# Patient Record
Sex: MALE | Race: WHITE | NOT HISPANIC OR LATINO | Employment: OTHER | URBAN - METROPOLITAN AREA
[De-identification: names, ages, dates, MRNs, and addresses within clinical notes are randomized per-mention and may not be internally consistent; named-entity substitution may affect disease eponyms.]

---

## 2017-05-18 ENCOUNTER — HISTORICAL (OUTPATIENT)
Dept: RADIOLOGY | Facility: HOSPITAL | Age: 67
End: 2017-05-18

## 2018-02-20 ENCOUNTER — HISTORICAL (OUTPATIENT)
Dept: LAB | Facility: HOSPITAL | Age: 68
End: 2018-02-20

## 2018-02-20 LAB
ALBUMIN SERPL-MCNC: 4 GM/DL (ref 3.4–5)
ALP SERPL-CCNC: 73 UNIT/L (ref 46–116)
ALT SERPL-CCNC: 22 UNIT/L (ref 12–78)
AST SERPL-CCNC: 11 UNIT/L (ref 15–37)
BILIRUB SERPL-MCNC: 0.6 MG/DL (ref 0.2–1)
BILIRUBIN DIRECT+TOT PNL SERPL-MCNC: 0.15 MG/DL (ref 0–0.2)
BILIRUBIN DIRECT+TOT PNL SERPL-MCNC: 0.46 MG/DL (ref 0–0.8)
BUN SERPL-MCNC: 16.4 MG/DL (ref 7–18)
CALCIUM SERPL-MCNC: 9.2 MG/DL (ref 8.5–10.1)
CHLORIDE SERPL-SCNC: 104 MMOL/L (ref 98–107)
CHOLEST SERPL-MCNC: 131 MG/DL (ref 0–200)
CHOLEST/HDLC SERPL: 3.3 {RATIO} (ref 0–5)
CO2 SERPL-SCNC: 29.7 MMOL/L (ref 21–32)
CREAT SERPL-MCNC: 1.24 MG/DL (ref 0.6–1.3)
ERYTHROCYTE [DISTWIDTH] IN BLOOD BY AUTOMATED COUNT: 13.4 % (ref 11.5–17)
FT4I SERPL CALC-MCNC: 1.95
GLUCOSE SERPL-MCNC: 99 MG/DL (ref 74–106)
HCT VFR BLD AUTO: 42.8 % (ref 42–52)
HDLC SERPL-MCNC: 40 MG/DL (ref 40–60)
HGB BLD-MCNC: 14.6 GM/DL (ref 14–18)
LDLC SERPL CALC-MCNC: 69 MG/DL (ref 0–129)
MCH RBC QN AUTO: 30.4 PG (ref 27–31)
MCHC RBC AUTO-ENTMCNC: 34.2 GM/DL (ref 33–36)
MCV RBC AUTO: 88.9 FL (ref 80–94)
PLATELET # BLD AUTO: 181 X10(3)/MCL (ref 130–400)
PMV BLD AUTO: 8.2 FL (ref 7.4–10.4)
POTASSIUM SERPL-SCNC: 5 MMOL/L (ref 3.5–5.1)
PROT SERPL-MCNC: 7.2 GM/DL (ref 6.4–8.2)
RBC # BLD AUTO: 4.81 X10(6)/MCL (ref 4.7–6.1)
SODIUM SERPL-SCNC: 140 MMOL/L (ref 136–145)
T3RU NFR SERPL: 33 % (ref 31–39)
T4 SERPL-MCNC: 5.9 MCG/DL (ref 4.7–13.3)
TRIGL SERPL-MCNC: 110 MG/DL
TSH SERPL-ACNC: 5.69 MIU/ML (ref 0.36–3.74)
VLDLC SERPL CALC-MCNC: 22 MG/DL
WBC # SPEC AUTO: 5.7 X10(3)/MCL (ref 4.5–11.5)

## 2019-03-18 ENCOUNTER — HISTORICAL (OUTPATIENT)
Dept: LAB | Facility: HOSPITAL | Age: 69
End: 2019-03-18

## 2019-03-18 LAB
ABS NEUT (OLG): 2.12 X10(3)/MCL (ref 2.1–9.2)
ALBUMIN SERPL-MCNC: 4.3 GM/DL (ref 3.4–5)
ALP SERPL-CCNC: 74 UNIT/L (ref 46–116)
ALT SERPL-CCNC: 25 UNIT/L (ref 12–78)
AST SERPL-CCNC: 17 UNIT/L (ref 15–37)
BASOPHILS # BLD AUTO: 0 X10(3)/MCL (ref 0–0.2)
BASOPHILS NFR BLD AUTO: 1 %
BILIRUB SERPL-MCNC: 0.4 MG/DL (ref 0.2–1)
BILIRUBIN DIRECT+TOT PNL SERPL-MCNC: 0.12 MG/DL (ref 0–0.2)
BILIRUBIN DIRECT+TOT PNL SERPL-MCNC: 0.28 MG/DL (ref 0–0.8)
BUN SERPL-MCNC: 17.3 MG/DL (ref 7–18)
CALCIUM SERPL-MCNC: 9.6 MG/DL (ref 8.5–10.1)
CHLORIDE SERPL-SCNC: 106 MMOL/L (ref 98–107)
CHOLEST SERPL-MCNC: 137 MG/DL (ref 0–200)
CHOLEST/HDLC SERPL: 3.4 {RATIO} (ref 0–5)
CO2 SERPL-SCNC: 25.8 MMOL/L (ref 21–32)
CREAT SERPL-MCNC: 1.24 MG/DL (ref 0.6–1.3)
CREAT/UREA NIT SERPL: 14
EOSINOPHIL # BLD AUTO: 0.1 X10(3)/MCL (ref 0–0.9)
EOSINOPHIL NFR BLD AUTO: 2 %
ERYTHROCYTE [DISTWIDTH] IN BLOOD BY AUTOMATED COUNT: 11.9 % (ref 11.5–17)
FT4I SERPL CALC-MCNC: 1.79
GLUCOSE SERPL-MCNC: 104 MG/DL (ref 74–106)
HCT VFR BLD AUTO: 38.1 % (ref 42–52)
HDLC SERPL-MCNC: 40 MG/DL (ref 40–60)
HGB BLD-MCNC: 13.3 GM/DL (ref 14–18)
IMM GRANULOCYTES # BLD AUTO: 0.01 % (ref 0–0.02)
IMM GRANULOCYTES NFR BLD AUTO: 0.3 % (ref 0–0.43)
LDLC SERPL CALC-MCNC: 67 MG/DL (ref 0–129)
LYMPHOCYTES # BLD AUTO: 0.7 X10(3)/MCL (ref 0.6–4.6)
LYMPHOCYTES NFR BLD AUTO: 22 %
MCH RBC QN AUTO: 31.7 PG (ref 27–31)
MCHC RBC AUTO-ENTMCNC: 34.9 GM/DL (ref 33–36)
MCV RBC AUTO: 90.9 FL (ref 80–94)
MONOCYTES # BLD AUTO: 0.4 X10(3)/MCL (ref 0.1–1.3)
MONOCYTES NFR BLD AUTO: 11 %
NEUTROPHILS # BLD AUTO: 2.12 X10(3)/MCL (ref 1.4–7.9)
NEUTROPHILS NFR BLD AUTO: 65 %
PLATELET # BLD AUTO: 185 X10(3)/MCL (ref 130–400)
PMV BLD AUTO: 9.4 FL (ref 9.4–12.4)
POTASSIUM SERPL-SCNC: 4.7 MMOL/L (ref 3.5–5.1)
PROT SERPL-MCNC: 7.1 GM/DL (ref 6.4–8.2)
RBC # BLD AUTO: 4.19 X10(6)/MCL (ref 4.7–6.1)
SODIUM SERPL-SCNC: 143 MMOL/L (ref 136–145)
T3RU NFR SERPL: 32 % (ref 31–39)
T4 SERPL-MCNC: 5.6 MCG/DL (ref 4.7–13.3)
TRIGL SERPL-MCNC: 152 MG/DL
TSH SERPL-ACNC: 1.28 MIU/ML (ref 0.36–3.74)
VLDLC SERPL CALC-MCNC: 30 MG/DL
WBC # SPEC AUTO: 3.3 X10(3)/MCL (ref 4.5–11.5)

## 2019-11-14 ENCOUNTER — HISTORICAL (OUTPATIENT)
Dept: RADIOLOGY | Facility: HOSPITAL | Age: 69
End: 2019-11-14

## 2019-11-14 LAB
BNP BLD-MCNC: 83 PG/ML (ref 0–125)
ERYTHROCYTE [DISTWIDTH] IN BLOOD BY AUTOMATED COUNT: 12.8 % (ref 11.5–17)
HCT VFR BLD AUTO: 40.9 % (ref 42–52)
HGB BLD-MCNC: 13.2 GM/DL (ref 14–18)
MCH RBC QN AUTO: 31.2 PG (ref 27–31)
MCHC RBC AUTO-ENTMCNC: 32.3 GM/DL (ref 33–36)
MCV RBC AUTO: 96.7 FL (ref 80–94)
PLATELET # BLD AUTO: 228 X10(3)/MCL (ref 130–400)
PMV BLD AUTO: 10.2 FL (ref 9.4–12.4)
PSA SERPL-MCNC: <0.01 NG/ML (ref 0–4)
RBC # BLD AUTO: 4.23 X10(6)/MCL (ref 4.7–6.1)
TSH SERPL-ACNC: 2.64 MIU/ML (ref 0.36–3.74)
WBC # SPEC AUTO: 4.5 X10(3)/MCL (ref 4.5–11.5)

## 2019-12-03 ENCOUNTER — HISTORICAL (OUTPATIENT)
Dept: RESPIRATORY THERAPY | Facility: HOSPITAL | Age: 69
End: 2019-12-03

## 2020-01-24 ENCOUNTER — HISTORICAL (OUTPATIENT)
Dept: CARDIOLOGY | Facility: HOSPITAL | Age: 70
End: 2020-01-24

## 2020-05-08 ENCOUNTER — HISTORICAL (OUTPATIENT)
Dept: LAB | Facility: HOSPITAL | Age: 70
End: 2020-05-08

## 2020-05-08 LAB
ABS NEUT (OLG): 2.64 X10(3)/MCL (ref 2.1–9.2)
ALBUMIN SERPL-MCNC: 4.6 GM/DL (ref 3.4–5)
ALP SERPL-CCNC: 75 UNIT/L (ref 46–116)
ALT SERPL-CCNC: 21 UNIT/L (ref 12–78)
ANTINUCLEAR ANTIBODY SCREEN (OHS): NEGATIVE
AST SERPL-CCNC: 11 UNIT/L (ref 15–37)
BASOPHILS # BLD AUTO: 0 X10(3)/MCL (ref 0–0.2)
BASOPHILS NFR BLD AUTO: 1 %
BILIRUB SERPL-MCNC: 0.6 MG/DL (ref 0.2–1)
BILIRUBIN DIRECT+TOT PNL SERPL-MCNC: 0.18 MG/DL (ref 0–0.2)
BILIRUBIN DIRECT+TOT PNL SERPL-MCNC: 0.42 MG/DL (ref 0–0.8)
BUN SERPL-MCNC: 14.4 MG/DL (ref 7–18)
CALCIUM SERPL-MCNC: 8.9 MG/DL (ref 8.5–10.1)
CHLORIDE SERPL-SCNC: 105 MMOL/L (ref 98–107)
CHOLEST SERPL-MCNC: 123 MG/DL (ref 0–200)
CHOLEST/HDLC SERPL: 3.3 {RATIO} (ref 0–5)
CO2 SERPL-SCNC: 24.8 MMOL/L (ref 21–32)
CREAT SERPL-MCNC: 1.27 MG/DL (ref 0.6–1.3)
CREAT/UREA NIT SERPL: 11
DSDNA ANTIBODY (OHS): NEGATIVE
EOSINOPHIL # BLD AUTO: 0.1 X10(3)/MCL (ref 0–0.9)
EOSINOPHIL NFR BLD AUTO: 4 %
ERYTHROCYTE [DISTWIDTH] IN BLOOD BY AUTOMATED COUNT: 12.5 % (ref 11.5–17)
ERYTHROCYTE [SEDIMENTATION RATE] IN BLOOD: 7 MM/HR (ref 0–15)
EST. AVERAGE GLUCOSE BLD GHB EST-MCNC: 111.2 MG/DL
FT4I SERPL CALC-MCNC: 2.45
GLUCOSE SERPL-MCNC: 114 MG/DL (ref 74–106)
HBA1C MFR BLD: 5.5 %
HCT VFR BLD AUTO: 37.5 % (ref 42–52)
HDLC SERPL-MCNC: 37 MG/DL (ref 40–60)
HGB BLD-MCNC: 13 GM/DL (ref 14–18)
IMM GRANULOCYTES # BLD AUTO: 0.01 % (ref 0–0.02)
IMM GRANULOCYTES NFR BLD AUTO: 0.3 % (ref 0–0.43)
LDLC SERPL CALC-MCNC: 65 MG/DL (ref 0–129)
LYMPHOCYTES # BLD AUTO: 0.8 X10(3)/MCL (ref 0.6–4.6)
LYMPHOCYTES NFR BLD AUTO: 21 %
MCH RBC QN AUTO: 31.9 PG (ref 27–31)
MCHC RBC AUTO-ENTMCNC: 34.7 GM/DL (ref 33–36)
MCV RBC AUTO: 91.9 FL (ref 80–94)
MONOCYTES # BLD AUTO: 0.3 X10(3)/MCL (ref 0.1–1.3)
MONOCYTES NFR BLD AUTO: 8 %
NEUTROPHILS # BLD AUTO: 2.64 X10(3)/MCL (ref 1.4–7.9)
NEUTROPHILS NFR BLD AUTO: 66 %
PLATELET # BLD AUTO: 207 X10(3)/MCL (ref 130–400)
PMV BLD AUTO: 9.3 FL (ref 9.4–12.4)
POTASSIUM SERPL-SCNC: 4 MMOL/L (ref 3.5–5.1)
PROT SERPL-MCNC: 7.1 GM/DL (ref 6.4–8.2)
PSA SERPL-MCNC: 0.02 NG/ML (ref 0–4)
RBC # BLD AUTO: 4.08 X10(6)/MCL (ref 4.7–6.1)
RHEUMATOID FACT SERPL-ACNC: <13 IU/ML
SMITH DP IGG (OHS): NEGATIVE
SODIUM SERPL-SCNC: 141 MMOL/L (ref 136–145)
T3RU NFR SERPL: 34 % (ref 31–39)
T4 SERPL-MCNC: 7.2 MCG/DL (ref 4.7–13.3)
TRIGL SERPL-MCNC: 104 MG/DL
TSH SERPL-ACNC: 3.12 MIU/ML (ref 0.36–3.74)
U1RNP ANTIBODY (OHS): NEGATIVE
VLDLC SERPL CALC-MCNC: 21 MG/DL
WBC # SPEC AUTO: 4 X10(3)/MCL (ref 4.5–11.5)

## 2020-11-04 ENCOUNTER — HISTORICAL (OUTPATIENT)
Dept: ADMINISTRATIVE | Facility: HOSPITAL | Age: 70
End: 2020-11-04

## 2020-12-07 ENCOUNTER — HISTORICAL (OUTPATIENT)
Dept: ADMINISTRATIVE | Facility: HOSPITAL | Age: 70
End: 2020-12-07

## 2021-06-08 ENCOUNTER — HISTORICAL (OUTPATIENT)
Dept: LAB | Facility: HOSPITAL | Age: 71
End: 2021-06-08

## 2021-06-08 LAB
ABS NEUT (OLG): 3.34 X10(3)/MCL (ref 2.1–9.2)
ALBUMIN SERPL-MCNC: 4 GM/DL (ref 3.4–4.8)
ALP SERPL-CCNC: 81 UNIT/L (ref 40–150)
ALT SERPL-CCNC: 13 UNIT/L (ref 0–55)
AST SERPL-CCNC: 14 UNIT/L (ref 5–34)
BASOPHILS # BLD AUTO: 0 X10(3)/MCL (ref 0–0.2)
BASOPHILS NFR BLD AUTO: 1 %
BILIRUB SERPL-MCNC: 0.6 MG/DL
BILIRUBIN DIRECT+TOT PNL SERPL-MCNC: 0.3 MG/DL (ref 0–0.5)
BILIRUBIN DIRECT+TOT PNL SERPL-MCNC: 0.3 MG/DL (ref 0–0.8)
BUN SERPL-MCNC: 14.9 MG/DL (ref 8.4–25.7)
CALCIUM SERPL-MCNC: 8.9 MG/DL (ref 8.8–10)
CHLORIDE SERPL-SCNC: 106 MMOL/L (ref 98–107)
CHOLEST SERPL-MCNC: 137 MG/DL
CHOLEST/HDLC SERPL: 4 {RATIO} (ref 0–5)
CO2 SERPL-SCNC: 24 MMOL/L (ref 23–31)
CREAT SERPL-MCNC: 0.99 MG/DL (ref 0.73–1.18)
CREAT/UREA NIT SERPL: 15
EOSINOPHIL # BLD AUTO: 0.2 X10(3)/MCL (ref 0–0.9)
EOSINOPHIL NFR BLD AUTO: 4 %
ERYTHROCYTE [DISTWIDTH] IN BLOOD BY AUTOMATED COUNT: 12.7 % (ref 11.5–17)
FT4I SERPL CALC-MCNC: 2.25 (ref 2.6–3.6)
GLUCOSE SERPL-MCNC: 102 MG/DL (ref 82–115)
HCT VFR BLD AUTO: 39 % (ref 42–52)
HDLC SERPL-MCNC: 32 MG/DL (ref 35–60)
HGB BLD-MCNC: 13.2 GM/DL (ref 14–18)
IMM GRANULOCYTES # BLD AUTO: 0.01 % (ref 0–0.02)
IMM GRANULOCYTES NFR BLD AUTO: 0.2 % (ref 0–0.43)
LDLC SERPL CALC-MCNC: 48 MG/DL (ref 50–140)
LYMPHOCYTES # BLD AUTO: 1.4 X10(3)/MCL (ref 0.6–4.6)
LYMPHOCYTES NFR BLD AUTO: 25 %
MCH RBC QN AUTO: 30.8 PG (ref 27–31)
MCHC RBC AUTO-ENTMCNC: 33.8 GM/DL (ref 33–36)
MCV RBC AUTO: 90.9 FL (ref 80–94)
MONOCYTES # BLD AUTO: 0.4 X10(3)/MCL (ref 0.1–1.3)
MONOCYTES NFR BLD AUTO: 8 %
NEUTROPHILS # BLD AUTO: 3.34 X10(3)/MCL (ref 1.4–7.9)
NEUTROPHILS NFR BLD AUTO: 62 %
PLATELET # BLD AUTO: 228 X10(3)/MCL (ref 130–400)
PMV BLD AUTO: 10.4 FL (ref 9.4–12.4)
POTASSIUM SERPL-SCNC: 3.8 MMOL/L (ref 3.5–5.1)
PROT SERPL-MCNC: 6.7 GM/DL (ref 5.8–7.6)
PSA SERPL-MCNC: <0.01 NG/ML (ref 0–4)
RBC # BLD AUTO: 4.29 X10(6)/MCL (ref 4.7–6.1)
SODIUM SERPL-SCNC: 142 MMOL/L (ref 136–145)
T3RU NFR SERPL: 36.4 % (ref 31–39)
T4 SERPL-MCNC: 6.18 UG/DL (ref 4.87–11.72)
TRIGL SERPL-MCNC: 283 MG/DL (ref 34–140)
TSH SERPL-ACNC: 2.31 UIU/ML (ref 0.35–4.94)
VLDLC SERPL CALC-MCNC: 57 MG/DL
WBC # SPEC AUTO: 5.4 X10(3)/MCL (ref 4.5–11.5)

## 2021-08-05 ENCOUNTER — HISTORICAL (OUTPATIENT)
Dept: RADIOLOGY | Facility: HOSPITAL | Age: 71
End: 2021-08-05

## 2021-08-05 LAB
ABS NEUT (OLG): 2.99 X10(3)/MCL (ref 2.1–9.2)
ALBUMIN SERPL-MCNC: 3.9 GM/DL (ref 3.4–4.8)
ALBUMIN/GLOB SERPL: 1.3 RATIO (ref 1.1–2)
ALP SERPL-CCNC: 86 UNIT/L (ref 40–150)
ALT SERPL-CCNC: 15 UNIT/L (ref 0–55)
AST SERPL-CCNC: 15 UNIT/L (ref 5–34)
BASOPHILS # BLD AUTO: 0 X10(3)/MCL (ref 0–0.2)
BASOPHILS NFR BLD AUTO: 1 %
BILIRUB SERPL-MCNC: 0.6 MG/DL
BILIRUBIN DIRECT+TOT PNL SERPL-MCNC: 0.3 MG/DL (ref 0–0.5)
BILIRUBIN DIRECT+TOT PNL SERPL-MCNC: 0.3 MG/DL (ref 0–0.8)
BUN SERPL-MCNC: 17 MG/DL (ref 8.4–25.7)
CALCIUM SERPL-MCNC: 9.2 MG/DL (ref 8.8–10)
CHLORIDE SERPL-SCNC: 109 MMOL/L (ref 98–107)
CO2 SERPL-SCNC: 22 MMOL/L (ref 23–31)
CREAT SERPL-MCNC: 0.96 MG/DL (ref 0.73–1.18)
EOSINOPHIL # BLD AUTO: 0.2 X10(3)/MCL (ref 0–0.9)
EOSINOPHIL NFR BLD AUTO: 4 %
ERYTHROCYTE [DISTWIDTH] IN BLOOD BY AUTOMATED COUNT: 13.6 % (ref 11.5–17)
GLOBULIN SER-MCNC: 2.9 GM/DL (ref 2.4–3.5)
GLUCOSE SERPL-MCNC: 105 MG/DL (ref 82–115)
HCT VFR BLD AUTO: 38.9 % (ref 42–52)
HGB BLD-MCNC: 13.1 GM/DL (ref 14–18)
IMM GRANULOCYTES # BLD AUTO: 0.01 % (ref 0–0.02)
IMM GRANULOCYTES NFR BLD AUTO: 0.2 % (ref 0–0.43)
LYMPHOCYTES # BLD AUTO: 1.2 X10(3)/MCL (ref 0.6–4.6)
LYMPHOCYTES NFR BLD AUTO: 25 %
MCH RBC QN AUTO: 31.5 PG (ref 27–31)
MCHC RBC AUTO-ENTMCNC: 33.7 GM/DL (ref 33–36)
MCV RBC AUTO: 93.5 FL (ref 80–94)
MONOCYTES # BLD AUTO: 0.5 X10(3)/MCL (ref 0.1–1.3)
MONOCYTES NFR BLD AUTO: 10 %
NEUTROPHILS # BLD AUTO: 2.99 X10(3)/MCL (ref 1.4–7.9)
NEUTROPHILS NFR BLD AUTO: 61 %
PLATELET # BLD AUTO: 210 X10(3)/MCL (ref 130–400)
PMV BLD AUTO: 9.4 FL (ref 9.4–12.4)
POTASSIUM SERPL-SCNC: 4.6 MMOL/L (ref 3.5–5.1)
PROT SERPL-MCNC: 6.8 GM/DL (ref 5.8–7.6)
RBC # BLD AUTO: 4.16 X10(6)/MCL (ref 4.7–6.1)
SODIUM SERPL-SCNC: 138 MMOL/L (ref 136–145)
WBC # SPEC AUTO: 4.9 X10(3)/MCL (ref 4.5–11.5)

## 2022-04-30 NOTE — OP NOTE
Patient:   Avery Hill Jr            MRN: [id number]            FIN: [id number]               Age:   70 years     Sex:  Male     :  1950   Associated Diagnoses:   None   Author:   Josh Fiore MD      Preoperative diagnosis: Cataract of the right  eye     Postoperative diagnosis: Same     Operative procedure: Cataract extraction with intraocular lens implant    Lens Style: ZCB00    The patient was brought to the operating theater by wheelchair and under light sedation given topical anesthesia using 0.75% Marcaine.  After adequate anesthesia the patient was then prepped and draped in usual ophthalmological manner.   Nanci lid speculums were applied and under the surgical microscope a keratome incision was made temporally using a danielle keratome blade and a 15° danielel keratome stab incision was made in the superior nasal quadrant.  Viscoat was instilled into the anterior chamber and an anterior capsulorrhexis was performed using a bent 25-gauge needle and the anterior capsule flap withdrawn with Kelman forceps. Using an irrigating Kelman cystotome the nucleus was irrigated and rocked free from the cortical bed and the handpiece was withdrawn. The phacoemulsification handpiece was introduced into the eye and phacoemulsification carried out the posterior chamber and the iris plane while a nucleus manipulator was used to direct the nucleus fragments  towards the phacoemulsification tip and prevent corneal contact. After phacoemulsification of the nucleus was completed the handpiece was withdrawn and an irrigation-aspiration handpiece was introduced into the eye and all visible cortical fragments were aspirated from the eye without difficulty. The handpiece was withdrawn and Healon was introduced into the eye to inflate the anterior chamber and the capsular bag.  An intraocular lens implant was selected, inspected, folded and placed into the lens injector and then the lens carefully injected  into the capsular bag while the haptics were positioned using the nucleus manipulator. The Healon was then aspirated from the eye using an irrigation-aspiration handpiece and the handpiece withdrawn from the eye. The anterior chamber was inflated with balanced salt solution and the wound checked for water tightness and found to be intact.  The antibiotic drops used preoperatively were instilled into the eye and the patient sent to the outpatient recovery in good condition.

## 2022-04-30 NOTE — OP NOTE
Patient:   Avery Hill Jr            MRN: [id number]            FIN: [id number]               Age:   70 years     Sex:  Male     :  1950   Associated Diagnoses:   None   Author:   Josh Fiore MD      Preoperative diagnosis: Cataract of the left eye     Postoperative diagnosis: Same     Operative procedure: Cataract extraction with intraocular lens implant    Lens Style: ZCB00    The patient was brought to the operating theater by wheelchair and under light sedation given topical anesthesia using 0.75% Marcaine.  After adequate anesthesia the patient was then prepped and draped in usual ophthalmological manner.   Nanci lid speculums were applied and under the surgical microscope a keratome incision was made temporally using a danielle keratome blade and a 15° danielle keratome stab incision was made in the superior nasal quadrant.  Viscoat was instilled into the anterior chamber and an anterior capsulorrhexis was performed using a bent 25-gauge needle and the anterior capsule flap withdrawn with Kelman forceps. Using an irrigating Kelman cystotome the nucleus was irrigated and rocked free from the cortical bed and the handpiece was withdrawn. The phacoemulsification handpiece was introduced into the eye and phacoemulsification carried out the posterior chamber and the iris plane while a nucleus manipulator was used to direct the nucleus fragments  towards the phacoemulsification tip and prevent corneal contact. After phacoemulsification of the nucleus was completed the handpiece was withdrawn and an irrigation-aspiration handpiece was introduced into the eye and all visible cortical fragments were aspirated from the eye without difficulty. The handpiece was withdrawn and Healon was introduced into the eye to inflate the anterior chamber and the capsular bag.  An intraocular lens implant was selected, inspected, folded and placed into the lens injector and then the lens carefully injected  into the capsular bag while the haptics were positioned using the nucleus manipulator. The Healon was then aspirated from the eye using an irrigation-aspiration handpiece and the handpiece withdrawn from the eye. The anterior chamber was inflated with balanced salt solution and the wound checked for water tightness and found to be intact.  The antibiotic drops used preoperatively were instilled into the eye and the patient sent to the outpatient recovery in good condition.

## 2022-06-17 ENCOUNTER — LAB VISIT (OUTPATIENT)
Dept: LAB | Facility: HOSPITAL | Age: 72
End: 2022-06-17
Attending: FAMILY MEDICINE
Payer: MEDICARE

## 2022-06-17 DIAGNOSIS — R73.09 OTHER ABNORMAL GLUCOSE: ICD-10-CM

## 2022-06-17 DIAGNOSIS — Z12.5 ENCOUNTER FOR SCREENING FOR MALIGNANT NEOPLASM OF PROSTATE: ICD-10-CM

## 2022-06-17 DIAGNOSIS — I10 ESSENTIAL HYPERTENSION, MALIGNANT: ICD-10-CM

## 2022-06-17 DIAGNOSIS — Z00.00 ROUTINE GENERAL MEDICAL EXAMINATION AT A HEALTH CARE FACILITY: Primary | ICD-10-CM

## 2022-06-17 LAB
ALBUMIN SERPL-MCNC: 4.2 GM/DL (ref 3.4–4.8)
ALP SERPL-CCNC: 74 UNIT/L (ref 40–150)
ALT SERPL-CCNC: 29 UNIT/L (ref 0–55)
ANION GAP SERPL CALC-SCNC: 10 MEQ/L
AST SERPL-CCNC: 25 UNIT/L (ref 5–34)
BASOPHILS # BLD AUTO: 0.03 X10(3)/MCL (ref 0–0.2)
BASOPHILS NFR BLD AUTO: 0.7 %
BILIRUBIN DIRECT+TOT PNL SERPL-MCNC: 0.2 MG/DL (ref 0–0.5)
BILIRUBIN DIRECT+TOT PNL SERPL-MCNC: 0.4 MG/DL (ref 0–0.8)
BILIRUBIN DIRECT+TOT PNL SERPL-MCNC: 0.6 MG/DL
BUN SERPL-MCNC: 15.5 MG/DL (ref 8.4–25.7)
CALCIUM SERPL-MCNC: 9.2 MG/DL (ref 8.8–10)
CHLORIDE SERPL-SCNC: 108 MMOL/L (ref 98–107)
CHOLEST SERPL-MCNC: 148 MG/DL
CHOLEST/HDLC SERPL: 3 {RATIO} (ref 0–5)
CO2 SERPL-SCNC: 24 MMOL/L (ref 23–31)
CREAT SERPL-MCNC: 1.12 MG/DL (ref 0.73–1.18)
CREAT/UREA NIT SERPL: 14
EOSINOPHIL # BLD AUTO: 0.17 X10(3)/MCL (ref 0–0.9)
EOSINOPHIL NFR BLD AUTO: 3.8 %
ERYTHROCYTE [DISTWIDTH] IN BLOOD BY AUTOMATED COUNT: 13.1 % (ref 11.5–17)
FT4I SERPL CALC-MCNC: 2.3 (ref 2.6–3.6)
GLUCOSE SERPL-MCNC: 117 MG/DL (ref 82–115)
HCT VFR BLD AUTO: 40.3 % (ref 42–52)
HDLC SERPL-MCNC: 43 MG/DL (ref 35–60)
HGB BLD-MCNC: 13.2 GM/DL (ref 14–18)
IMM GRANULOCYTES # BLD AUTO: 0.01 X10(3)/MCL (ref 0–0.02)
IMM GRANULOCYTES NFR BLD AUTO: 0.2 % (ref 0–0.43)
LDLC SERPL CALC-MCNC: 69 MG/DL (ref 50–140)
LYMPHOCYTES # BLD AUTO: 0.98 X10(3)/MCL (ref 0.6–4.6)
LYMPHOCYTES NFR BLD AUTO: 22 %
MCH RBC QN AUTO: 30.8 PG (ref 27–31)
MCHC RBC AUTO-ENTMCNC: 32.8 MG/DL (ref 33–36)
MCV RBC AUTO: 94.2 FL (ref 80–94)
MONOCYTES # BLD AUTO: 0.43 X10(3)/MCL (ref 0.1–1.3)
MONOCYTES NFR BLD AUTO: 9.6 %
NEUTROPHILS # BLD AUTO: 2.8 X10(3)/MCL (ref 2.1–9.2)
NEUTROPHILS NFR BLD AUTO: 63.7 %
PLATELET # BLD AUTO: 218 X10(3)/MCL (ref 130–400)
PMV BLD AUTO: 10.2 FL (ref 9.4–12.4)
POTASSIUM SERPL-SCNC: 4.3 MMOL/L (ref 3.5–5.1)
PROT SERPL-MCNC: 7.1 GM/DL (ref 5.8–7.6)
PSA SERPL-MCNC: <0.02 NG/ML
RBC # BLD AUTO: 4.28 X10(6)/MCL (ref 4.7–6.1)
SODIUM SERPL-SCNC: 142 MMOL/L (ref 136–145)
T3RU NFR SERPL: 36.69 % (ref 31–39)
T4 SERPL-MCNC: 6.26 UG/DL (ref 4.87–11.72)
TRIGL SERPL-MCNC: 180 MG/DL (ref 34–140)
TSH SERPL-ACNC: 1.2 UIU/ML (ref 0.35–4.94)
VLDLC SERPL CALC-MCNC: 36 MG/DL
WBC # SPEC AUTO: 4.5 X10(3)/MCL (ref 4.5–11.5)

## 2022-06-17 PROCEDURE — 84436 ASSAY OF TOTAL THYROXINE: CPT

## 2022-06-17 PROCEDURE — 84443 ASSAY THYROID STIM HORMONE: CPT

## 2022-06-17 PROCEDURE — 84153 ASSAY OF PSA TOTAL: CPT

## 2022-06-17 PROCEDURE — 84479 ASSAY OF THYROID (T3 OR T4): CPT

## 2022-06-17 PROCEDURE — 80061 LIPID PANEL: CPT

## 2022-06-17 PROCEDURE — 85025 COMPLETE CBC W/AUTO DIFF WBC: CPT

## 2022-06-17 PROCEDURE — 36415 COLL VENOUS BLD VENIPUNCTURE: CPT

## 2022-06-17 PROCEDURE — 80053 COMPREHEN METABOLIC PANEL: CPT

## 2022-06-17 PROCEDURE — 82248 BILIRUBIN DIRECT: CPT

## 2022-06-17 PROCEDURE — 83036 HEMOGLOBIN GLYCOSYLATED A1C: CPT

## 2022-06-20 LAB
EST. AVERAGE GLUCOSE BLD GHB EST-MCNC: 111.2 MG/DL
HBA1C MFR BLD: 5.5 %

## 2022-08-15 ENCOUNTER — HOSPITAL ENCOUNTER (INPATIENT)
Facility: HOSPITAL | Age: 72
LOS: 2 days | Discharge: HOME | DRG: 439 | End: 2022-08-19
Attending: EMERGENCY MEDICINE | Admitting: STUDENT IN AN ORGANIZED HEALTH CARE EDUCATION/TRAINING PROGRAM
Payer: MEDICARE

## 2022-08-15 DIAGNOSIS — K85.10 ACUTE GALLSTONE PANCREATITIS: Primary | ICD-10-CM

## 2022-08-15 DIAGNOSIS — K85.20 ALCOHOL-INDUCED ACUTE PANCREATITIS WITHOUT INFECTION OR NECROSIS: ICD-10-CM

## 2022-08-15 PROBLEM — N40.0 BENIGN PROSTATIC HYPERPLASIA: Status: ACTIVE | Noted: 2022-08-15

## 2022-08-15 PROBLEM — M54.9 CHRONIC BACK PAIN: Status: ACTIVE | Noted: 2022-08-15

## 2022-08-15 PROBLEM — I25.10 ARTERIOSCLEROSIS OF CORONARY ARTERY: Status: ACTIVE | Noted: 2022-08-15

## 2022-08-15 PROBLEM — R01.1 HEART MURMUR: Status: ACTIVE | Noted: 2022-08-15

## 2022-08-15 PROBLEM — E78.5 HYPERLIPIDEMIA: Status: ACTIVE | Noted: 2022-08-15

## 2022-08-15 PROBLEM — I10 HYPERTENSION: Status: ACTIVE | Noted: 2022-08-15

## 2022-08-15 PROBLEM — G89.29 CHRONIC BACK PAIN: Status: ACTIVE | Noted: 2022-08-15

## 2022-08-15 PROBLEM — C61 CARCINOMA OF PROSTATE: Status: ACTIVE | Noted: 2022-08-15

## 2022-08-15 LAB
ALBUMIN SERPL-MCNC: 3.9 GM/DL (ref 3.4–4.8)
ALBUMIN/GLOB SERPL: 1.3 RATIO (ref 1.1–2)
ALP SERPL-CCNC: 120 UNIT/L (ref 40–150)
ALT SERPL-CCNC: 288 UNIT/L (ref 0–55)
AMYLASE SERPL-CCNC: 191 UNIT/L (ref 20–160)
APPEARANCE UR: CLEAR
AST SERPL-CCNC: 362 UNIT/L (ref 5–34)
BACTERIA #/AREA URNS AUTO: NORMAL /HPF
BASOPHILS # BLD AUTO: 0.01 X10(3)/MCL (ref 0–0.2)
BASOPHILS NFR BLD AUTO: 0.1 %
BILIRUB UR QL STRIP.AUTO: ABNORMAL MG/DL
BILIRUBIN DIRECT+TOT PNL SERPL-MCNC: 3.3 MG/DL
BUN SERPL-MCNC: 16 MG/DL (ref 8.4–25.7)
CALCIUM SERPL-MCNC: 9.1 MG/DL (ref 8.8–10)
CHLORIDE SERPL-SCNC: 105 MMOL/L (ref 98–107)
CO2 SERPL-SCNC: 25 MMOL/L (ref 23–31)
COLOR UR AUTO: ABNORMAL
CREAT SERPL-MCNC: 1.04 MG/DL (ref 0.73–1.18)
EOSINOPHIL # BLD AUTO: 0.1 X10(3)/MCL (ref 0–0.9)
EOSINOPHIL NFR BLD AUTO: 1 %
ERYTHROCYTE [DISTWIDTH] IN BLOOD BY AUTOMATED COUNT: 12.9 % (ref 11.5–17)
FLUAV AG UPPER RESP QL IA.RAPID: NOT DETECTED
FLUBV AG UPPER RESP QL IA.RAPID: NOT DETECTED
GFR SERPLBLD CREATININE-BSD FMLA CKD-EPI: >60 MLS/MIN/1.73/M2
GLOBULIN SER-MCNC: 3 GM/DL (ref 2.4–3.5)
GLUCOSE SERPL-MCNC: 148 MG/DL (ref 82–115)
GLUCOSE UR QL STRIP.AUTO: NEGATIVE MG/DL
HCT VFR BLD AUTO: 37.1 % (ref 42–52)
HGB BLD-MCNC: 12.6 GM/DL (ref 14–18)
KETONES UR QL STRIP.AUTO: ABNORMAL MG/DL
LEUKOCYTE ESTERASE UR QL STRIP.AUTO: NEGATIVE UNIT/L
LIPASE SERPL-CCNC: 237 U/L
LYMPHOCYTES # BLD AUTO: 0.45 X10(3)/MCL (ref 0.6–4.6)
LYMPHOCYTES NFR BLD AUTO: 4.6 %
MCH RBC QN AUTO: 31.4 PG (ref 27–31)
MCHC RBC AUTO-ENTMCNC: 34 MG/DL (ref 33–36)
MCV RBC AUTO: 92.5 FL (ref 80–94)
MONOCYTES # BLD AUTO: 0.46 X10(3)/MCL (ref 0.1–1.3)
MONOCYTES NFR BLD AUTO: 4.8 %
NEUTROPHILS # BLD AUTO: 8.6 X10(3)/MCL (ref 2.1–9.2)
NEUTROPHILS NFR BLD AUTO: 89.2 %
NITRITE UR QL STRIP.AUTO: NEGATIVE
PH UR STRIP.AUTO: 6 [PH]
PLATELET # BLD AUTO: 226 X10(3)/MCL (ref 130–400)
PMV BLD AUTO: 9.9 FL (ref 7.4–10.4)
POTASSIUM SERPL-SCNC: 4.3 MMOL/L (ref 3.5–5.1)
PROT SERPL-MCNC: 6.9 GM/DL (ref 5.8–7.6)
PROT UR QL STRIP.AUTO: ABNORMAL MG/DL
RBC # BLD AUTO: 4.01 X10(6)/MCL (ref 4.7–6.1)
RBC #/AREA URNS AUTO: NORMAL /HPF
RBC UR QL AUTO: NEGATIVE UNIT/L
SARS-COV-2 RNA RESP QL NAA+PROBE: NOT DETECTED
SODIUM SERPL-SCNC: 139 MMOL/L (ref 136–145)
SP GR UR STRIP.AUTO: 1.02
SQUAMOUS #/AREA URNS AUTO: NORMAL /HPF
UROBILINOGEN UR STRIP-ACNC: >=8 MG/DL
WBC # SPEC AUTO: 9.7 X10(3)/MCL (ref 4.5–11.5)
WBC #/AREA URNS AUTO: NORMAL /HPF

## 2022-08-15 PROCEDURE — 36415 COLL VENOUS BLD VENIPUNCTURE: CPT | Performed by: EMERGENCY MEDICINE

## 2022-08-15 PROCEDURE — G0378 HOSPITAL OBSERVATION PER HR: HCPCS

## 2022-08-15 PROCEDURE — 25000003 PHARM REV CODE 250: Performed by: EMERGENCY MEDICINE

## 2022-08-15 PROCEDURE — 25000003 PHARM REV CODE 250: Performed by: STUDENT IN AN ORGANIZED HEALTH CARE EDUCATION/TRAINING PROGRAM

## 2022-08-15 PROCEDURE — 87636 SARSCOV2 & INF A&B AMP PRB: CPT | Performed by: EMERGENCY MEDICINE

## 2022-08-15 PROCEDURE — 81001 URINALYSIS AUTO W/SCOPE: CPT | Performed by: EMERGENCY MEDICINE

## 2022-08-15 PROCEDURE — 83690 ASSAY OF LIPASE: CPT | Performed by: EMERGENCY MEDICINE

## 2022-08-15 PROCEDURE — 82150 ASSAY OF AMYLASE: CPT | Performed by: STUDENT IN AN ORGANIZED HEALTH CARE EDUCATION/TRAINING PROGRAM

## 2022-08-15 PROCEDURE — 85025 COMPLETE CBC W/AUTO DIFF WBC: CPT | Performed by: EMERGENCY MEDICINE

## 2022-08-15 PROCEDURE — 63600175 PHARM REV CODE 636 W HCPCS: Performed by: STUDENT IN AN ORGANIZED HEALTH CARE EDUCATION/TRAINING PROGRAM

## 2022-08-15 PROCEDURE — 80053 COMPREHEN METABOLIC PANEL: CPT | Performed by: EMERGENCY MEDICINE

## 2022-08-15 PROCEDURE — 25500020 PHARM REV CODE 255: Performed by: EMERGENCY MEDICINE

## 2022-08-15 PROCEDURE — 87040 BLOOD CULTURE FOR BACTERIA: CPT | Performed by: STUDENT IN AN ORGANIZED HEALTH CARE EDUCATION/TRAINING PROGRAM

## 2022-08-15 PROCEDURE — 36415 COLL VENOUS BLD VENIPUNCTURE: CPT | Performed by: STUDENT IN AN ORGANIZED HEALTH CARE EDUCATION/TRAINING PROGRAM

## 2022-08-15 PROCEDURE — 87077 CULTURE AEROBIC IDENTIFY: CPT | Performed by: STUDENT IN AN ORGANIZED HEALTH CARE EDUCATION/TRAINING PROGRAM

## 2022-08-15 PROCEDURE — 99285 EMERGENCY DEPT VISIT HI MDM: CPT | Mod: 25,CS

## 2022-08-15 RX ORDER — LORAZEPAM 2 MG/ML
0.5 INJECTION INTRAMUSCULAR EVERY 4 HOURS PRN
Status: DISCONTINUED | OUTPATIENT
Start: 2022-08-15 | End: 2022-08-19 | Stop reason: HOSPADM

## 2022-08-15 RX ORDER — LACTULOSE 10 G/15ML
10 SOLUTION ORAL; RECTAL DAILY
COMMUNITY
Start: 2022-03-28

## 2022-08-15 RX ORDER — PRAMIPEXOLE DIHYDROCHLORIDE 0.5 MG/1
0.25 TABLET ORAL DAILY
COMMUNITY
Start: 2022-06-27

## 2022-08-15 RX ORDER — LOSARTAN POTASSIUM 50 MG/1
50 TABLET ORAL DAILY
COMMUNITY
Start: 2022-05-14

## 2022-08-15 RX ORDER — FUROSEMIDE 20 MG/1
20 TABLET ORAL DAILY
COMMUNITY
Start: 2022-08-12

## 2022-08-15 RX ORDER — ONDANSETRON HYDROCHLORIDE 8 MG/1
8 TABLET, FILM COATED ORAL EVERY 8 HOURS PRN
Status: ON HOLD | COMMUNITY
End: 2022-10-28

## 2022-08-15 RX ORDER — PROMETHAZINE HYDROCHLORIDE 25 MG/ML
25 INJECTION, SOLUTION INTRAMUSCULAR; INTRAVENOUS EVERY 6 HOURS PRN
Status: DISCONTINUED | OUTPATIENT
Start: 2022-08-15 | End: 2022-08-19 | Stop reason: HOSPADM

## 2022-08-15 RX ORDER — PANTOPRAZOLE SODIUM 40 MG/1
40 TABLET, DELAYED RELEASE ORAL DAILY
COMMUNITY
Start: 2022-06-20

## 2022-08-15 RX ORDER — MORPHINE SULFATE 4 MG/ML
2 INJECTION, SOLUTION INTRAMUSCULAR; INTRAVENOUS EVERY 4 HOURS PRN
Status: DISCONTINUED | OUTPATIENT
Start: 2022-08-15 | End: 2022-08-18

## 2022-08-15 RX ORDER — ACETAMINOPHEN 650 MG/1
650 SUPPOSITORY RECTAL EVERY 4 HOURS PRN
Status: DISCONTINUED | OUTPATIENT
Start: 2022-08-15 | End: 2022-08-18

## 2022-08-15 RX ORDER — TAMSULOSIN HYDROCHLORIDE 0.4 MG/1
2 CAPSULE ORAL DAILY
COMMUNITY
Start: 2022-08-12

## 2022-08-15 RX ORDER — CLOMIPHENE CITRATE 50 MG/1
0.5 TABLET ORAL DAILY
Status: ON HOLD | COMMUNITY
Start: 2022-06-18 | End: 2022-10-28

## 2022-08-15 RX ORDER — MELOXICAM 15 MG/1
15 TABLET ORAL DAILY
Status: ON HOLD | COMMUNITY
End: 2022-10-28

## 2022-08-15 RX ORDER — MEGESTROL ACETATE 20 MG/1
20 TABLET ORAL DAILY
COMMUNITY

## 2022-08-15 RX ORDER — LABETALOL HCL 20 MG/4 ML
10 SYRINGE (ML) INTRAVENOUS EVERY 4 HOURS PRN
Status: DISCONTINUED | OUTPATIENT
Start: 2022-08-15 | End: 2022-08-19 | Stop reason: HOSPADM

## 2022-08-15 RX ORDER — ATORVASTATIN CALCIUM 20 MG/1
20 TABLET, FILM COATED ORAL DAILY
COMMUNITY
Start: 2022-08-12

## 2022-08-15 RX ORDER — TIZANIDINE 4 MG/1
4 TABLET ORAL 2 TIMES DAILY
Status: ON HOLD | COMMUNITY
End: 2022-10-28

## 2022-08-15 RX ORDER — SODIUM CHLORIDE 9 MG/ML
INJECTION, SOLUTION INTRAVENOUS CONTINUOUS
Status: DISCONTINUED | OUTPATIENT
Start: 2022-08-15 | End: 2022-08-18

## 2022-08-15 RX ORDER — HYDROCODONE BITARTRATE AND ACETAMINOPHEN 10; 325 MG/1; MG/1
1 TABLET ORAL EVERY 8 HOURS PRN
COMMUNITY
Start: 2022-08-11

## 2022-08-15 RX ORDER — BICALUTAMIDE 50 MG/1
50 TABLET, FILM COATED ORAL DAILY
Status: ON HOLD | COMMUNITY
End: 2022-10-28

## 2022-08-15 RX ORDER — ASPIRIN 325 MG
325 TABLET ORAL DAILY
COMMUNITY

## 2022-08-15 RX ADMIN — SODIUM CHLORIDE: 9 INJECTION, SOLUTION INTRAVENOUS at 02:08

## 2022-08-15 RX ADMIN — MORPHINE SULFATE 2 MG: 4 INJECTION INTRAVENOUS at 09:08

## 2022-08-15 RX ADMIN — SODIUM CHLORIDE 500 ML: 9 INJECTION, SOLUTION INTRAVENOUS at 11:08

## 2022-08-15 RX ADMIN — IOPAMIDOL 100 ML: 755 INJECTION, SOLUTION INTRAVENOUS at 11:08

## 2022-08-15 RX ADMIN — SODIUM CHLORIDE: 9 INJECTION, SOLUTION INTRAVENOUS at 10:08

## 2022-08-15 NOTE — H&P
[facility]  [facility] Medicine  History & Physical    Patient Name: Avery Hill Jr.  MRN: [id number]  Patient Class: OP- Observation  Admission Date: 8/15/2022  Attending Physician: Thairy G Reyes, DO   Primary Care Provider: Chad Moran Jr, MD         Patient information was obtained from patient and ER records.     Subjective:     Principal Problem:Alcohol-induced acute pancreatitis without infection or necrosis    Chief Complaint:   Chief Complaint   Patient presents with    Abdominal Pain     Epigastric pain this am with N,V --pt with history of gallstones        HPI: 72-year-old male with a past medical history of ETOH abuse, hypertension, prostate cancer (last dose of radiation 3 years ago, Lupron), BPH, CABG (x2 vessels in 2004), hyperlipidemia, COPD secondary to former tobacco use who presents with complaint of epigastric pain after drinking heavily over the weekend.  Patient reports he drinks 6-7 beers several times a week. This weekend had 6-7beers and approximately 1 pt of liquor over the weekend.  Patient states he had similar episodes a few weeks ago that resolved with p.o. fluid intake.  Patient reports pain on presentation was a 6 now 3. Affirms associated chills.      Past Medical History:   Diagnosis Date    Cancer     Coronary artery disease        Past Surgical History:   Procedure Laterality Date    APPENDECTOMY      BACK SURGERY      CARDIAC SURGERY      HERNIA REPAIR      NASAL SEPTUM SURGERY         Review of patient's allergies indicates:  No Known Allergies    No current facility-administered medications on file prior to encounter.     Current Outpatient Medications on File Prior to Encounter   Medication Sig    aspirin 325 MG tablet Take 325 mg by mouth once daily.    atorvastatin (LIPITOR) 20 MG tablet Take 20 mg by mouth once daily.    bicalutamide (CASODEX) 50 MG Tab Take 50 mg by mouth Daily.    clomiPHENE (CLOMID) 50 mg tablet Take 0.5  tablets by mouth once daily.    furosemide (LASIX) 20 MG tablet Take 20 mg by mouth once daily.    HYDROcodone-acetaminophen (NORCO)  mg per tablet Take 1 tablet by mouth 3 (three) times daily.    lactulose (CHRONULAC) 10 gram/15 mL solution Take 10 g by mouth Daily.    losartan (COZAAR) 50 MG tablet Take 50 mg by mouth once daily.    megestroL (MEGACE) 20 MG Tab Take 20 mg by mouth Daily.    meloxicam (MOBIC) 15 MG tablet Take 15 mg by mouth once daily.    ondansetron (ZOFRAN) 8 MG tablet Take by mouth every 8 (eight) hours as needed for Nausea.    pantoprazole (PROTONIX) 40 MG tablet Take 40 mg by mouth once daily.    pramipexole (MIRAPEX) 0.5 MG tablet Take 0.25 mg by mouth once daily.    tamsulosin (FLOMAX) 0.4 mg Cap Take 2 capsules by mouth once daily.    tiZANidine (ZANAFLEX) 4 MG tablet Take 4 mg by mouth 2 (two) times daily.     Family History    None       Tobacco Use    Smoking status: Former Smoker    Smokeless tobacco: Never Used   Substance and Sexual Activity    Alcohol use: Yes     Comment: weekends    Drug use: Never    Sexual activity: Not on file     Review of Systems   Constitutional:  Negative for fatigue and fever.   HENT:  Negative for congestion, ear pain, postnasal drip, sinus pain and sore throat.    Eyes:  Negative for pain, redness and visual disturbance.   Respiratory:  Negative for cough, shortness of breath and wheezing.    Cardiovascular:  Negative for chest pain, palpitations and leg swelling.   Gastrointestinal:  Positive for abdominal pain (epigastric). Negative for diarrhea, nausea and vomiting.   Endocrine: Negative for cold intolerance and heat intolerance.   Musculoskeletal:  Negative for arthralgias, joint swelling and myalgias.   Skin:  Negative for rash and wound.   Neurological:  Negative for dizziness, weakness, numbness and headaches.   Objective:     Vital Signs (Most Recent):  Temp: 100.3 °F (37.9 °C) (08/15/22 1551)  Pulse: 81 (08/15/22  1551)  Resp: 18 (08/15/22 1430)  BP: 127/66 (08/15/22 1551)  SpO2: (!) 94 % (08/15/22 1551)   Vital Signs (24h Range):  Temp:  [98.8 °F (37.1 °C)-100.3 °F (37.9 °C)] 100.3 °F (37.9 °C)  Pulse:  [77-97] 81  Resp:  [16-20] 18  SpO2:  [94 %-99 %] 94 %  BP: (109-142)/(62-74) 127/66     Weight: 78 kg (172 lb)  Body mass index is 26.15 kg/m².    Physical Exam  Constitutional:       General: He is not in acute distress.     Appearance: Normal appearance. He is normal weight.   HENT:      Mouth/Throat:      Mouth: Mucous membranes are moist.      Pharynx: Oropharynx is clear. No oropharyngeal exudate or posterior oropharyngeal erythema.   Eyes:      Extraocular Movements: Extraocular movements intact.      Conjunctiva/sclera: Conjunctivae normal.      Pupils: Pupils are equal, round, and reactive to light.   Neck:      Thyroid: No thyromegaly.   Cardiovascular:      Rate and Rhythm: Normal rate and regular rhythm.      Heart sounds: No murmur heard.    No friction rub. No gallop.   Pulmonary:      Breath sounds: Normal breath sounds.   Abdominal:      General: Bowel sounds are normal. There is no distension.      Palpations: Abdomen is soft.      Tenderness: There is abdominal tenderness (epigastric).   Lymphadenopathy:      Cervical: No cervical adenopathy.   Skin:     General: Skin is warm.      Findings: No rash.   Neurological:      General: No focal deficit present.      Mental Status: He is oriented to person, place, and time.      Cranial Nerves: No cranial nerve deficit.   Psychiatric:         Mood and Affect: Mood is not anxious or depressed. Affect is not inappropriate.         CRANIAL NERVES     CN III, IV, VI   Pupils are equal, round, and reactive to light.     Significant Labs: All pertinent labs within the past 24 hours have been reviewed.    Significant Imaging: I have reviewed all pertinent imaging results/findings within the past 24 hours.    Assessment/Plan:     * Alcohol-induced acute pancreatitis  without infection or necrosis  -lipase 237, ,   -patient's triglyceride on record review was 180 1 month ago  -CT abdomen pelvis with contrast showed normal pancreas, cholelithiasis  -ultrasound of the abdomen showed no abnormality identified of the pancreas, liver with hepatic echogenicity, no evidence of cholecystitis  -NPO, IVF, pain control  -prn ativan as pt's last drink was Saturday night       Benign prostatic hyperplasia  -resume home medications in the AM if pt ok to resume po meds      Carcinoma of prostate  -s/p 45 sessions of radiation       Chronic back pain  -prn pain control       Hyperlipidemia  -resume home meds in AM      Hypertension  -resume home meds in the morning   -PRN iv labetalol overnight       Admitted observation status under my care, critical care time 40 minutes  VTE Risk Mitigation (From admission, onward)         Ordered     IP VTE HIGH RISK PATIENT  Once         08/15/22 1339     Place BETTY hose  Until discontinued         08/15/22 1339                   Thairy G Reyes, DO  Department of Hospital Medicine   [facility]

## 2022-08-15 NOTE — PLAN OF CARE
[facility]  Initial Discharge Assessment       Primary Care Provider: Chad Moran Jr, MD    Admission Diagnosis: Alcohol-induced acute pancreatitis without infection or necrosis [K85.20]    Admission Date: 8/15/2022  Expected Discharge Date:     Discharge Barriers Identified: None    Payor: MEDICARE / Plan: MEDICARE PART A & B / Product Type: Government /     Extended Emergency Contact Information  Primary Emergency Contact: ALF PALOMARES  Mobile Phone: [phone]  Relation: Spouse  Preferred language: English   needed? No    Discharge Plan A: Home Health, Home with family         [facility] - [place], [place] - [address]  [address]  [place] [address]  Phone: [phone] Fax: [phone]      Initial Assessment (most recent)     Adult Discharge Assessment - 08/15/22 1714        Discharge Assessment    Assessment Type Discharge Planning Assessment     Confirmed/corrected address, phone number and insurance Yes     Confirmed Demographics Correct on Facesheet     Source of Information family     If unable to respond/provide information was family/caregiver contacted? Yes     Contact Name/Number Alf spouse      Communicated XIOMARA with patient/caregiver Date not available/Unable to determine     Reason For Admission EDUARDA     Lives With alone     Facility Arrived From: home     Do you expect to return to your current living situation? Yes     Do you have help at home or someone to help you manage your care at home? Yes     Who are your caregiver(s) and their phone number(s)? Alf spouse      Prior to hospitilization cognitive status: Alert/Oriented     Current cognitive status: Alert/Oriented     Walking or Climbing Stairs Difficulty none     Dressing/Bathing Difficulty none     Home Accessibility wheelchair accessible     Home Layout Able to live on 1st floor     Equipment Currently Used at Home none      rx omnicef Readmission within 30 days? No     Patient currently being followed by outpatient case management? No     Do you currently have service(s) that help you manage your care at home? No     Is the pt/caregiver preference to resume services with current agency No     Do you take prescription medications? Yes     Do you have prescription coverage? Yes     Do you have any problems affording any of your prescribed medications? TBD     Is the patient taking medications as prescribed? yes     Who is going to help you get home at discharge? Lina spouse      How do you get to doctors appointments? family or friend will provide     Are you on dialysis? No     Do you take coumadin? No     Discharge Plan A Home Health;Home with family     DME Needed Upon Discharge  walker, standard     Discharge Plan discussed with: Friend     Name(s) and Number(s) Lina spouse      Discharge Barriers Identified None

## 2022-08-15 NOTE — HPI
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  Patient reports he drinks 6-7 beers several times a week. This weekend had 6-7beers and approximately 1 pt of liquor over the weekend.  Patient states he had similar episodes a few weeks ago that resolved with p.o. fluid intake.  Patient reports pain on presentation was a 6 now 3. Affirms associated chills.

## 2022-08-15 NOTE — ED PROVIDER NOTES
Encounter Date: 8/15/2022       History     Chief Complaint   Patient presents with    Abdominal Pain     Epigastric pain this am with N,V --pt with history of gallstones     This 71 y/o man presents with c/o epigastric abdominal pain with nausea and vomiting for the past two days.    Review of patient's allergies indicates:  No Known Allergies  Past Medical History:   Diagnosis Date    Cancer     Coronary artery disease      Past Surgical History:   Procedure Laterality Date    APPENDECTOMY      BACK SURGERY      CARDIAC SURGERY      HERNIA REPAIR      NASAL SEPTUM SURGERY       History reviewed. No pertinent family history.  Social History     Tobacco Use    Smoking status: Former Smoker    Smokeless tobacco: Never Used   Substance Use Topics    Alcohol use: Yes     Comment: weekends    Drug use: Never     Review of Systems   Constitutional:  Negative for fever.   HENT:  Negative for sore throat.    Respiratory:  Negative for shortness of breath.    Cardiovascular:  Negative for chest pain.   Gastrointestinal:  Positive for abdominal pain, nausea and vomiting.   Genitourinary:  Negative for dysuria.   Musculoskeletal:  Negative for back pain.   Skin:  Negative for rash.   Neurological:  Negative for weakness.   Hematological:  Does not bruise/bleed easily.     Physical Exam     Initial Vitals   BP Pulse Resp Temp SpO2   08/15/22 0915 08/15/22 0911 08/15/22 0911 08/15/22 0911 08/15/22 0911   109/66 97 20 99.3 °F (37.4 °C) 96 %      MAP       --                Physical Exam    Nursing note and vitals reviewed.  Constitutional: He appears well-developed and well-nourished.   HENT:   Head: Normocephalic and atraumatic.   Mouth/Throat: Mucous membranes are normal.   Eyes: EOM are normal. Pupils are equal, round, and reactive to light.   Neck: Neck supple.   Normal range of motion.  Cardiovascular:  Normal rate, regular rhythm, normal heart sounds and intact distal pulses.           Pulmonary/Chest: Breath sounds  normal.   Abdominal: Abdomen is soft. Bowel sounds are normal. There is abdominal tenderness (epigastric). There is no rebound and no guarding.   Musculoskeletal:         General: Normal range of motion.      Cervical back: Normal range of motion and neck supple.     Neurological: He is alert and oriented to person, place, and time. He has normal strength.   Skin: Skin is warm and dry. Capillary refill takes less than 2 seconds.   Psychiatric: He has a normal mood and affect. His behavior is normal. Judgment and thought content normal.       ED Course   Procedures  Labs Reviewed   COMPREHENSIVE METABOLIC PANEL - Abnormal; Notable for the following components:       Result Value    Glucose Level 148 (*)     Bilirubin Total 3.3 (*)     Alanine Aminotransferase 288 (*)     Aspartate Aminotransferase 362 (*)     All other components within normal limits   LIPASE - Abnormal; Notable for the following components:    Lipase Level 237 (*)     All other components within normal limits   CBC WITH DIFFERENTIAL - Abnormal; Notable for the following components:    RBC 4.01 (*)     Hgb 12.6 (*)     Hct 37.1 (*)     MCH 31.4 (*)     Lymph # 0.45 (*)     All other components within normal limits   URINALYSIS - Abnormal; Notable for the following components:    Color, UA Francisca (*)     Protein, UA Trace (*)     Ketones, UA Trace (*)     Bilirubin, UA Moderate (*)     Urobilinogen, UA >=8.0 (*)     All other components within normal limits   COVID/FLU A&B PCR - Normal   URINALYSIS, MICROSCOPIC - Normal   CBC W/ AUTO DIFFERENTIAL    Narrative:     The following orders were created for panel order CBC auto differential.  Procedure                               Abnormality         Status                     ---------                               -----------         ------                     CBC with Differential[[id number]]        Abnormal            Final result                 Please view results for these tests on the individual  orders.          Imaging Results              CT Abdomen Pelvis With Contrast (Final result)  Result time 08/15/22 12:26:49      Final result by Jose Manuel Mark MD (08/15/22 12:26:49)                   Impression:      1. No acute process identified.  2. Cholelithiasis.      Electronically signed by: Jose Manuel Mark  Date:    08/15/2022  Time:    12:26               Narrative:    EXAMINATION:  CT ABDOMEN PELVIS WITH CONTRAST    CLINICAL HISTORY:  Abdominal abscess/infection suspected;    TECHNIQUE:  CT imaging of the abdomen and pelvis after the administration of intravenous contrast. Dose length product is 1190 mGycm. Automatic exposure control, adjustment of mA/kV or iterative reconstruction technique used to limit radiation dose.    COMPARISON:  No relevant comparison studies available at the time of dictation.    FINDINGS:  Liver/biliary: Subcentimeter right hepatic hypodensity too small to fully evaluate, but statistically benign.  Few gallstones.  No biliary dilatation.    Pancreas: Normal.    Spleen: Upper normal in size.    Adrenals: Normal.    Genitourinary: 15 mm Bosniak 2 cyst in the left kidney.  This needs no dedicated imaging follow-up by consensus recommendation.  No hydronephrosis.  Bladder decompressed and not well evaluated.  Prostate size upper normal.    Stomach/bowel: No evidence of bowel obstruction. Appendix not confidently seen.  No definitive sites of bowel inflammation.    Lymph nodes: No pathologically enlarged lymph node identified.    Peritoneum: No ascites or free air. No fluid collection.    Vasculature: At least moderate calcifications in the abdominal aorta and iliac arteries.    Abdominal wall: Very small fat containing left inguinal hernia.    Lung bases: No consolidation or pleural effusion.    Musculoskeletal: No acute osseous findings. Prior posterior decompression at L4 with L3-4 and L4-5 interbody implants.                                       US Abdomen Limited (Final result)   Result time 08/15/22 10:58:23   Procedure changed from US Abdomen Limited_Gallbladder     Final result by Jose Manuel Mark MD (08/15/22 10:58:23)                   Impression:      1. Cholelithiasis.  No biliary dilatation or sonographic findings for cholecystitis.  2. Hepatic steatosis.      Electronically signed by: Jose Manuel Mark  Date:    08/15/2022  Time:    10:58               Narrative:    EXAMINATION:  US ABDOMEN LIMITED    CLINICAL HISTORY:  abdominal pain;    TECHNIQUE:  Gray-scale and color Doppler ultrasound images of the abdomen.    COMPARISON:  Ultrasound 08/05/2021    FINDINGS:  Pancreas partially obscured with no abnormality identified as imaged.  No significant findings regarding the imaged aorta and IVC.    Liver has normal size with mildly increased overall hepatic echogenicity.  No focal liver lesion identified.  Main portal vein patent with normal flow direction.    No biliary dilatation or ascites.  Few gallstones, measuring up to about 2 cm.  No gallbladder wall thickening.  Negative sonographic Nathan sign.    No hydronephrosis or defined calcification in the right kidney.    Measurements:    - Liver: 14.3 cm    - CBD diameter: 3 mm    - Right kidney: 11.5 cm in length                                       Medications   sodium chloride 0.9% bolus 500 mL (500 mLs Intravenous New Bag 8/15/22 1154)   iopamidoL (ISOVUE-370) injection 100 mL (100 mLs Intravenous Given 8/15/22 1156)                          Clinical Impression:   Final diagnoses:  [K85.20] Alcohol-induced acute pancreatitis without infection or necrosis (Primary)          ED Disposition Condition    Observation                 Ricky Mai MD  08/15/22 3952       Ricky Mai MD  09/15/22 0869

## 2022-08-15 NOTE — PLAN OF CARE
Problem: Adult Inpatient Plan of Care  Goal: Plan of Care Review  Outcome: Ongoing, Progressing  Flowsheets (Taken 8/15/2022 1614)  Plan of Care Reviewed With: patient  Goal: Patient-Specific Goal (Individualized)  Outcome: Ongoing, Progressing  Goal: Absence of Hospital-Acquired Illness or Injury  Outcome: Ongoing, Progressing  Intervention: Identify and Manage Fall Risk  Flowsheets (Taken 8/15/2022 1614)  Safety Promotion/Fall Prevention:   assistive device/personal item within reach   in recliner, wheels locked   instructed to call staff for mobility  Intervention: Prevent Skin Injury  Flowsheets (Taken 8/15/2022 1614)  Body Position: position changed independently  Skin Protection: incontinence pads utilized  Intervention: Prevent and Manage VTE (Venous Thromboembolism) Risk  Flowsheets (Taken 8/15/2022 1614)  Activity Management:   Ambulated in room - L4   Ambulated to bathroom - L4  VTE Prevention/Management:   ambulation promoted   bleeding precations maintained   bleeding risk assessed  Intervention: Prevent Infection  Flowsheets (Taken 8/15/2022 1614)  Infection Prevention:   hand hygiene promoted   personal protective equipment utilized   rest/sleep promoted   single patient room provided  Goal: Optimal Comfort and Wellbeing  Outcome: Ongoing, Progressing  Intervention: Monitor Pain and Promote Comfort  Flowsheets (Taken 8/15/2022 1614)  Pain Management Interventions:   position adjusted   quiet environment facilitated   relaxation techniques promoted  Intervention: Provide Person-Centered Care  Flowsheets (Taken 8/15/2022 1614)  Trust Relationship/Rapport:   care explained   choices provided   emotional support provided   empathic listening provided   questions answered   questions encouraged   reassurance provided   thoughts/feelings acknowledged  Goal: Readiness for Transition of Care  Outcome: Ongoing, Progressing  Intervention: Mutually Develop Transition Plan  Flowsheets (Taken 8/15/2022  1614)  Equipment Currently Used at Home: none  Transportation Anticipated: family or friend will provide  Communicated XIOMARA with patient/caregiver: Date not available/Unable to determine  Do you expect to return to your current living situation?: Yes  Do you have help at home or someone to help you manage your care at home?: Yes  Readmission within 30 days?: No  Do you currently have service(s) that help you manage your care at home?: No  Is the pt/caregiver preference to resume services with current agency: No     Problem: Pain Acute  Goal: Acceptable Pain Control and Functional Ability  Outcome: Ongoing, Progressing  Intervention: Develop Pain Management Plan  Flowsheets (Taken 8/15/2022 1614)  Pain Management Interventions:   position adjusted   quiet environment facilitated   relaxation techniques promoted  Intervention: Prevent or Manage Pain  Flowsheets (Taken 8/15/2022 1614)  Sleep/Rest Enhancement:   awakenings minimized   consistent schedule promoted   family presence promoted   natural light exposure provided   noise level reduced   regular sleep/rest pattern promoted   relaxation techniques promoted  Sensory Stimulation Regulation:   care clustered   lighting decreased   television on  Bowel Elimination Promotion:   ambulation promoted   privacy promoted  Medication Review/Management: infusion initiated  Intervention: Optimize Psychosocial Wellbeing  Flowsheets (Taken 8/15/2022 1614)  Supportive Measures:   active listening utilized   relaxation techniques promoted   positive reinforcement provided   verbalization of feelings encouraged  Diversional Activities: television

## 2022-08-15 NOTE — SUBJECTIVE & OBJECTIVE
Past Medical History:   Diagnosis Date    Cancer     Coronary artery disease        Past Surgical History:   Procedure Laterality Date    APPENDECTOMY      BACK SURGERY      CARDIAC SURGERY      HERNIA REPAIR      NASAL SEPTUM SURGERY         Review of patient's allergies indicates:  No Known Allergies    No current facility-administered medications on file prior to encounter.     Current Outpatient Medications on File Prior to Encounter   Medication Sig    aspirin 325 MG tablet Take 325 mg by mouth once daily.    atorvastatin (LIPITOR) 20 MG tablet Take 20 mg by mouth once daily.    bicalutamide (CASODEX) 50 MG Tab Take 50 mg by mouth Daily.    clomiPHENE (CLOMID) 50 mg tablet Take 0.5 tablets by mouth once daily.    furosemide (LASIX) 20 MG tablet Take 20 mg by mouth once daily.    HYDROcodone-acetaminophen (NORCO)  mg per tablet Take 1 tablet by mouth 3 (three) times daily.    lactulose (CHRONULAC) 10 gram/15 mL solution Take 10 g by mouth Daily.    losartan (COZAAR) 50 MG tablet Take 50 mg by mouth once daily.    megestroL (MEGACE) 20 MG Tab Take 20 mg by mouth Daily.    meloxicam (MOBIC) 15 MG tablet Take 15 mg by mouth once daily.    ondansetron (ZOFRAN) 8 MG tablet Take by mouth every 8 (eight) hours as needed for Nausea.    pantoprazole (PROTONIX) 40 MG tablet Take 40 mg by mouth once daily.    pramipexole (MIRAPEX) 0.5 MG tablet Take 0.25 mg by mouth once daily.    tamsulosin (FLOMAX) 0.4 mg Cap Take 2 capsules by mouth once daily.    tiZANidine (ZANAFLEX) 4 MG tablet Take 4 mg by mouth 2 (two) times daily.     Family History    None       Tobacco Use    Smoking status: Former Smoker    Smokeless tobacco: Never Used   Substance and Sexual Activity    Alcohol use: Yes     Comment: weekends    Drug use: Never    Sexual activity: Not on file     Review of Systems   Constitutional:  Negative for fatigue and fever.   HENT:  Negative for congestion, ear pain, postnasal drip, sinus pain and sore throat.     Eyes:  Negative for pain, redness and visual disturbance.   Respiratory:  Negative for cough, shortness of breath and wheezing.    Cardiovascular:  Negative for chest pain, palpitations and leg swelling.   Gastrointestinal:  Positive for abdominal pain (epigastric). Negative for diarrhea, nausea and vomiting.   Endocrine: Negative for cold intolerance and heat intolerance.   Musculoskeletal:  Negative for arthralgias, joint swelling and myalgias.   Skin:  Negative for rash and wound.   Neurological:  Negative for dizziness, weakness, numbness and headaches.   Objective:     Vital Signs (Most Recent):  Temp: 100.3 °F (37.9 °C) (08/15/22 1551)  Pulse: 81 (08/15/22 1551)  Resp: 18 (08/15/22 1430)  BP: 127/66 (08/15/22 1551)  SpO2: (!) 94 % (08/15/22 1551)   Vital Signs (24h Range):  Temp:  [98.8 °F (37.1 °C)-100.3 °F (37.9 °C)] 100.3 °F (37.9 °C)  Pulse:  [77-97] 81  Resp:  [16-20] 18  SpO2:  [94 %-99 %] 94 %  BP: (109-142)/(62-74) 127/66     Weight: 78 kg (172 lb)  Body mass index is 26.15 kg/m².    Physical Exam  Constitutional:       General: He is not in acute distress.     Appearance: Normal appearance. He is normal weight.   HENT:      Mouth/Throat:      Mouth: Mucous membranes are moist.      Pharynx: Oropharynx is clear. No oropharyngeal exudate or posterior oropharyngeal erythema.   Eyes:      Extraocular Movements: Extraocular movements intact.      Conjunctiva/sclera: Conjunctivae normal.      Pupils: Pupils are equal, round, and reactive to light.   Neck:      Thyroid: No thyromegaly.   Cardiovascular:      Rate and Rhythm: Normal rate and regular rhythm.      Heart sounds: No murmur heard.    No friction rub. No gallop.   Pulmonary:      Breath sounds: Normal breath sounds.   Abdominal:      General: Bowel sounds are normal. There is no distension.      Palpations: Abdomen is soft.      Tenderness: There is abdominal tenderness (epigastric).   Lymphadenopathy:      Cervical: No cervical adenopathy.    Skin:     General: Skin is warm.      Findings: No rash.   Neurological:      General: No focal deficit present.      Mental Status: He is oriented to person, place, and time.      Cranial Nerves: No cranial nerve deficit.   Psychiatric:         Mood and Affect: Mood is not anxious or depressed. Affect is not inappropriate.         CRANIAL NERVES     CN III, IV, VI   Pupils are equal, round, and reactive to light.     Significant Labs: All pertinent labs within the past 24 hours have been reviewed.    Significant Imaging: I have reviewed all pertinent imaging results/findings within the past 24 hours.

## 2022-08-15 NOTE — ASSESSMENT & PLAN NOTE
-lipase 237, ,   -patient's triglyceride on record review was 180 1 month ago  -CT abdomen pelvis with contrast showed normal pancreas, cholelithiasis  -ultrasound of the abdomen showed no abnormality identified of the pancreas, liver with hepatic echogenicity, no evidence of cholecystitis  -NPO, IVF, pain control  -prn ativan as pt's last drink was Saturday night

## 2022-08-16 PROBLEM — R78.81 GRAM-NEGATIVE BACTEREMIA: Status: ACTIVE | Noted: 2022-08-16

## 2022-08-16 LAB
ALBUMIN SERPL-MCNC: 3.1 GM/DL (ref 3.4–4.8)
ALBUMIN/GLOB SERPL: 1.2 RATIO (ref 1.1–2)
ALP SERPL-CCNC: 130 UNIT/L (ref 40–150)
ALT SERPL-CCNC: 259 UNIT/L (ref 0–55)
AST SERPL-CCNC: 173 UNIT/L (ref 5–34)
BASOPHILS # BLD AUTO: 0.02 X10(3)/MCL (ref 0–0.2)
BASOPHILS NFR BLD AUTO: 0.3 %
BILIRUBIN DIRECT+TOT PNL SERPL-MCNC: 7.2 MG/DL
BUN SERPL-MCNC: 11.9 MG/DL (ref 8.4–25.7)
CALCIUM SERPL-MCNC: 8.3 MG/DL (ref 8.8–10)
CHLORIDE SERPL-SCNC: 108 MMOL/L (ref 98–107)
CO2 SERPL-SCNC: 23 MMOL/L (ref 23–31)
CREAT SERPL-MCNC: 0.83 MG/DL (ref 0.73–1.18)
EOSINOPHIL # BLD AUTO: 0.28 X10(3)/MCL (ref 0–0.9)
EOSINOPHIL NFR BLD AUTO: 4.2 %
ERYTHROCYTE [DISTWIDTH] IN BLOOD BY AUTOMATED COUNT: 13.1 % (ref 11.5–17)
GFR SERPLBLD CREATININE-BSD FMLA CKD-EPI: >60 MLS/MIN/1.73/M2
GLOBULIN SER-MCNC: 2.5 GM/DL (ref 2.4–3.5)
GLUCOSE SERPL-MCNC: 93 MG/DL (ref 82–115)
HAV IGM SERPL QL IA: NONREACTIVE
HBV CORE IGM SERPL QL IA: NONREACTIVE
HBV SURFACE AG SERPL QL IA: NONREACTIVE
HCT VFR BLD AUTO: 33.4 % (ref 42–52)
HCV AB SERPL QL IA: NONREACTIVE
HGB BLD-MCNC: 11.1 GM/DL (ref 14–18)
IMM GRANULOCYTES # BLD AUTO: 0.02 X10(3)/MCL (ref 0–0.04)
IMM GRANULOCYTES NFR BLD AUTO: 0.3 %
LYMPHOCYTES # BLD AUTO: 0.42 X10(3)/MCL (ref 0.6–4.6)
LYMPHOCYTES NFR BLD AUTO: 6.3 %
MCH RBC QN AUTO: 30.7 PG (ref 27–31)
MCHC RBC AUTO-ENTMCNC: 33.2 MG/DL (ref 33–36)
MCV RBC AUTO: 92.5 FL (ref 80–94)
MONOCYTES # BLD AUTO: 0.58 X10(3)/MCL (ref 0.1–1.3)
MONOCYTES NFR BLD AUTO: 8.6 %
NEUTROPHILS # BLD AUTO: 5.4 X10(3)/MCL (ref 2.1–9.2)
NEUTROPHILS NFR BLD AUTO: 80.3 %
PLATELET # BLD AUTO: 189 X10(3)/MCL (ref 130–400)
PMV BLD AUTO: 10.1 FL (ref 7.4–10.4)
POTASSIUM SERPL-SCNC: 4.1 MMOL/L (ref 3.5–5.1)
PROT SERPL-MCNC: 5.6 GM/DL (ref 5.8–7.6)
RBC # BLD AUTO: 3.61 X10(6)/MCL (ref 4.7–6.1)
SODIUM SERPL-SCNC: 139 MMOL/L (ref 136–145)
WBC # SPEC AUTO: 6.7 X10(3)/MCL (ref 4.5–11.5)

## 2022-08-16 PROCEDURE — 25000003 PHARM REV CODE 250: Performed by: STUDENT IN AN ORGANIZED HEALTH CARE EDUCATION/TRAINING PROGRAM

## 2022-08-16 PROCEDURE — 80074 ACUTE HEPATITIS PANEL: CPT | Performed by: STUDENT IN AN ORGANIZED HEALTH CARE EDUCATION/TRAINING PROGRAM

## 2022-08-16 PROCEDURE — 80053 COMPREHEN METABOLIC PANEL: CPT | Performed by: STUDENT IN AN ORGANIZED HEALTH CARE EDUCATION/TRAINING PROGRAM

## 2022-08-16 PROCEDURE — 63600175 PHARM REV CODE 636 W HCPCS: Performed by: STUDENT IN AN ORGANIZED HEALTH CARE EDUCATION/TRAINING PROGRAM

## 2022-08-16 PROCEDURE — 36415 COLL VENOUS BLD VENIPUNCTURE: CPT | Performed by: STUDENT IN AN ORGANIZED HEALTH CARE EDUCATION/TRAINING PROGRAM

## 2022-08-16 PROCEDURE — G0378 HOSPITAL OBSERVATION PER HR: HCPCS

## 2022-08-16 PROCEDURE — 85025 COMPLETE CBC W/AUTO DIFF WBC: CPT | Performed by: STUDENT IN AN ORGANIZED HEALTH CARE EDUCATION/TRAINING PROGRAM

## 2022-08-16 PROCEDURE — 87040 BLOOD CULTURE FOR BACTERIA: CPT | Performed by: STUDENT IN AN ORGANIZED HEALTH CARE EDUCATION/TRAINING PROGRAM

## 2022-08-16 RX ORDER — PANTOPRAZOLE SODIUM 40 MG/1
40 TABLET, DELAYED RELEASE ORAL DAILY
Status: DISCONTINUED | OUTPATIENT
Start: 2022-08-16 | End: 2022-08-19 | Stop reason: HOSPADM

## 2022-08-16 RX ORDER — LOSARTAN POTASSIUM 50 MG/1
50 TABLET ORAL DAILY
Status: DISCONTINUED | OUTPATIENT
Start: 2022-08-16 | End: 2022-08-19 | Stop reason: HOSPADM

## 2022-08-16 RX ORDER — PRAMIPEXOLE DIHYDROCHLORIDE 0.12 MG/1
0.25 TABLET ORAL DAILY
Status: DISCONTINUED | OUTPATIENT
Start: 2022-08-16 | End: 2022-08-19 | Stop reason: HOSPADM

## 2022-08-16 RX ORDER — FUROSEMIDE 20 MG/1
20 TABLET ORAL DAILY
Status: DISCONTINUED | OUTPATIENT
Start: 2022-08-16 | End: 2022-08-19 | Stop reason: HOSPADM

## 2022-08-16 RX ORDER — HYDROCODONE BITARTRATE AND ACETAMINOPHEN 10; 325 MG/1; MG/1
1 TABLET ORAL EVERY 4 HOURS PRN
Status: DISCONTINUED | OUTPATIENT
Start: 2022-08-16 | End: 2022-08-19 | Stop reason: HOSPADM

## 2022-08-16 RX ORDER — MEGESTROL ACETATE 20 MG/1
20 TABLET ORAL DAILY
Status: DISCONTINUED | OUTPATIENT
Start: 2022-08-16 | End: 2022-08-19 | Stop reason: HOSPADM

## 2022-08-16 RX ORDER — ATORVASTATIN CALCIUM 10 MG/1
20 TABLET, FILM COATED ORAL DAILY
Status: DISCONTINUED | OUTPATIENT
Start: 2022-08-16 | End: 2022-08-19 | Stop reason: HOSPADM

## 2022-08-16 RX ORDER — MELOXICAM 7.5 MG/1
15 TABLET ORAL DAILY
Status: DISCONTINUED | OUTPATIENT
Start: 2022-08-16 | End: 2022-08-19 | Stop reason: HOSPADM

## 2022-08-16 RX ORDER — LACTULOSE 10 G/15ML
10 SOLUTION ORAL DAILY
Status: DISCONTINUED | OUTPATIENT
Start: 2022-08-16 | End: 2022-08-19 | Stop reason: HOSPADM

## 2022-08-16 RX ORDER — ASPIRIN 81 MG/1
81 TABLET ORAL DAILY
Status: DISCONTINUED | OUTPATIENT
Start: 2022-08-16 | End: 2022-08-19 | Stop reason: HOSPADM

## 2022-08-16 RX ORDER — TAMSULOSIN HYDROCHLORIDE 0.4 MG/1
2 CAPSULE ORAL DAILY
Status: DISCONTINUED | OUTPATIENT
Start: 2022-08-16 | End: 2022-08-19 | Stop reason: HOSPADM

## 2022-08-16 RX ORDER — TIZANIDINE 4 MG/1
4 TABLET ORAL 2 TIMES DAILY
Status: DISCONTINUED | OUTPATIENT
Start: 2022-08-16 | End: 2022-08-19 | Stop reason: HOSPADM

## 2022-08-16 RX ORDER — HYDROCODONE BITARTRATE AND ACETAMINOPHEN 10; 325 MG/1; MG/1
1 TABLET ORAL 3 TIMES DAILY
Status: DISCONTINUED | OUTPATIENT
Start: 2022-08-16 | End: 2022-08-16

## 2022-08-16 RX ADMIN — PRAMIPEXOLE DIHYDROCHLORIDE 0.25 MG: 0.12 TABLET ORAL at 08:08

## 2022-08-16 RX ADMIN — HYDROCODONE BITARTRATE AND ACETAMINOPHEN 1 TABLET: 10; 325 TABLET ORAL at 08:08

## 2022-08-16 RX ADMIN — TAMSULOSIN HYDROCHLORIDE 0.8 MG: 0.4 CAPSULE ORAL at 08:08

## 2022-08-16 RX ADMIN — FUROSEMIDE 20 MG: 20 TABLET ORAL at 08:08

## 2022-08-16 RX ADMIN — TIZANIDINE 4 MG: 4 TABLET ORAL at 08:08

## 2022-08-16 RX ADMIN — MELOXICAM 15 MG: 7.5 TABLET ORAL at 08:08

## 2022-08-16 RX ADMIN — ATORVASTATIN CALCIUM 20 MG: 10 TABLET, FILM COATED ORAL at 08:08

## 2022-08-16 RX ADMIN — SODIUM CHLORIDE: 9 INJECTION, SOLUTION INTRAVENOUS at 09:08

## 2022-08-16 RX ADMIN — ASPIRIN 81 MG: 81 TABLET, COATED ORAL at 08:08

## 2022-08-16 RX ADMIN — SODIUM CHLORIDE: 9 INJECTION, SOLUTION INTRAVENOUS at 06:08

## 2022-08-16 RX ADMIN — CEFTRIAXONE SODIUM 2 G: 2 INJECTION, POWDER, FOR SOLUTION INTRAMUSCULAR; INTRAVENOUS at 10:08

## 2022-08-16 RX ADMIN — PANTOPRAZOLE SODIUM 40 MG: 40 TABLET, DELAYED RELEASE ORAL at 08:08

## 2022-08-16 RX ADMIN — LACTULOSE 10 G: 10 SOLUTION ORAL at 05:08

## 2022-08-16 RX ADMIN — LOSARTAN POTASSIUM 50 MG: 50 TABLET, FILM COATED ORAL at 09:08

## 2022-08-16 RX ADMIN — MEGESTROL ACETATE 20 MG: 20 TABLET ORAL at 05:08

## 2022-08-16 NOTE — PROGRESS NOTES
"[facility]  Adult Nutrition  Progress Note    SUMMARY       Recommendations    Recommendation/Intervention: 1. continue full liquid diet. 2. advanced diet when appropriate to soft low fat, low fat diet.  Goals: Meet 50-75% of nutrition needs upon discharged  Nutrition Goal Status: new    Assessment and Plan    No new Assessment & Plan notes have been filed under this hospital service since the last note was generated.  Service: Nutrition       Malnutrition Assessment                                       Reason for Assessment    Reason For Assessment: length of stay  Diagnosis: other (see comments) (Alcohol-induced acute pancreatitis without infection or necrosis 8/15/2022    Benign prostatic hyperplasia 8/15/2022    Carcinoma of prostate 8/15/2022    Chronic back pain 8/15/2022    Hyperlipidemia 8/15/2022    Hypertension)  General Information Comments: MD progress notes HPI:  72-year-old male with a past medical history of ETOH abuse, hypertension, prostate cancer (last dose of radiation 3 years ago, Lupron), BPH, CABG (x2 vessels in 2004), hyperlipidemia, COPD secondary to former tobacco use who presents with complaint of epigastric pain after drinking heavily over the weekend.  Patient reports he drinks 6-7 beers several times a week. This weekend had 6-7beers and approximately 1 pt of liquor over the weekend.  Patient states he had similar episodes a few weeks ago that resolved with p.o. fluid intake.  Patient reports pain on presentation was a 6 now 3. Affirms associated chills. Today, nursing reports intake is good and tolerated full liquid diet.    Nutrition Risk Screen    Nutrition Risk Screen: no indicators present    Nutrition/Diet History         Anthropometrics    Temp: 98.7 °F (37.1 °C)  Height Method: Stated  Height: 5' 8" (172.7 cm)  Height (inches): 68 in  Weight Method: Standard Scale  Weight: 78 kg (171 lb 15.3 oz)  Weight (lb): 171.96 lb  Ideal Body Weight (IBW), " Male: 154 lb  % Ideal Body Weight, Male (lb): 111.66 %  BMI (Calculated): 26.2  BMI Grade: 25 - 29.9 - overweight       Lab/Procedures/Meds    Pertinent Labs Reviewed: reviewed  Pertinent Labs Comments: 8/16/22: RBC 3.61 L, H/H 11.1/33.4 L, Cl 108 H, Ca 8.3 L, TP 5.6 L, Alb 3.1 L,  H,  H  Pertinent Medications Reviewed: reviewed  Pertinent Medications Comments: furosemide    Physical Findings/Assessment         Estimated/Assessed Needs    Weight Used For Calorie Calculations: 78 kg (171 lb 15.3 oz)  Energy Calorie Requirements (kcal): 1950 (25 kcal/kg/CBW)  Energy Need Method: Kcal/kg  Protein Requirements: 78.16 (1.0 gm/kg/CBW)  Weight Used For Protein Calculations: 78 kg (171 lb 15.3 oz)     Estimated Fluid Requirement Method: RDA Method  RDA Method (mL): 1950         Nutrition Prescription Ordered    Current Diet Order: full liquid    Evaluation of Received Nutrient/Fluid Intake    Tolerance: tolerating      Nutrition Risk    Level of Risk/Frequency of Follow-up: low - moderate     Monitor and Evaluation    Food and Nutrient Intake: food and beverage intake  Food and Nutrient Adminstration: diet order  Biochemical Data, Medical Tests and Procedures: electrolyte and renal panel, glucose/endocrine profile     Nutrition Follow-Up    RD Follow-up?: Yes

## 2022-08-16 NOTE — PLAN OF CARE
Problem: Adult Inpatient Plan of Care  Goal: Plan of Care Review  Outcome: Ongoing, Progressing  Flowsheets (Taken 8/16/2022 1800)  Plan of Care Reviewed With:   patient   family  Goal: Patient-Specific Goal (Individualized)  Outcome: Ongoing, Progressing  Goal: Absence of Hospital-Acquired Illness or Injury  Outcome: Ongoing, Progressing  Intervention: Identify and Manage Fall Risk  Flowsheets (Taken 8/16/2022 1800)  Safety Promotion/Fall Prevention:   assistive device/personal item within reach   side rails raised x 2   instructed to call staff for mobility   nonskid shoes/socks when out of bed  Intervention: Prevent Skin Injury  Flowsheets (Taken 8/16/2022 1800)  Body Position: position changed independently  Skin Protection: incontinence pads utilized  Intervention: Prevent and Manage VTE (Venous Thromboembolism) Risk  Flowsheets (Taken 8/16/2022 1800)  Activity Management:   Ambulated to bathroom - L4   Ambulated in room - L4  VTE Prevention/Management:   bleeding risk assessed   bleeding precations maintained  Intervention: Prevent Infection  Flowsheets (Taken 8/16/2022 1800)  Infection Prevention:   hand hygiene promoted   personal protective equipment utilized   rest/sleep promoted   single patient room provided  Goal: Optimal Comfort and Wellbeing  Outcome: Ongoing, Progressing  Intervention: Monitor Pain and Promote Comfort  Flowsheets (Taken 8/16/2022 1800)  Pain Management Interventions: around-the-clock dosing utilized  Intervention: Provide Person-Centered Care  Flowsheets (Taken 8/16/2022 1800)  Trust Relationship/Rapport:   care explained   choices provided   emotional support provided   empathic listening provided   questions answered   thoughts/feelings acknowledged   reassurance provided   questions encouraged  Goal: Readiness for Transition of Care  Outcome: Ongoing, Progressing  Intervention: Mutually Develop Transition Plan  Flowsheets (Taken 8/16/2022 1800)  Equipment Currently Used at Home:  none  Communicated XIOMARA with patient/caregiver: Date not available/Unable to determine  Do you expect to return to your current living situation?: Yes  Do you have help at home or someone to help you manage your care at home?: Yes  Readmission within 30 days?: No  Do you currently have service(s) that help you manage your care at home?: No     Problem: Pain Acute  Goal: Acceptable Pain Control and Functional Ability  Outcome: Ongoing, Progressing  Intervention: Develop Pain Management Plan  Flowsheets (Taken 8/16/2022 1800)  Pain Management Interventions: around-the-clock dosing utilized  Intervention: Prevent or Manage Pain  Flowsheets (Taken 8/16/2022 1800)  Sleep/Rest Enhancement:   awakenings minimized   consistent schedule promoted   regular sleep/rest pattern promoted  Bowel Elimination Promotion:   ambulation promoted   adequate fluid intake promoted   privacy promoted  Medication Review/Management: medications reviewed  Intervention: Optimize Psychosocial Wellbeing  Flowsheets (Taken 8/16/2022 1800)  Supportive Measures: active listening utilized  Diversional Activities: television     Problem: Fall Injury Risk  Goal: Absence of Fall and Fall-Related Injury  Outcome: Ongoing, Progressing  Intervention: Identify and Manage Contributors  Flowsheets (Taken 8/16/2022 1800)  Self-Care Promotion: independence encouraged  Medication Review/Management: medications reviewed  Intervention: Promote Injury-Free Environment  Flowsheets (Taken 8/16/2022 1800)  Safety Promotion/Fall Prevention:   assistive device/personal item within reach   side rails raised x 2   instructed to call staff for mobility   nonskid shoes/socks when out of bed

## 2022-08-16 NOTE — ASSESSMENT & PLAN NOTE
-lipase 237, , ; LFTs down trending   -patient's triglyceride on record review was 180 1 month ago  -CT abdomen pelvis with contrast showed normal pancreas, cholelithiasis  -ultrasound of the abdomen showed no abnormality identified of the pancreas, liver with hepatic echogenicity, no evidence of cholecystitis  -advance diet as tolerated

## 2022-08-16 NOTE — PROGRESS NOTES
[facility]  [facility]  Progress Note    Patient Name: Avery Hill Jr.  MRN: [id number]  Patient Class: OP- Observation   Admission Date: 8/15/2022  Length of Stay: 0 days  Attending Physician: Thairy G Reyes, DO  Primary Care Provider: Chad Moran Jr, MD        Subjective:     Principal Problem:Alcohol-induced acute pancreatitis without infection or necrosis        HPI:  72-year-old male with a past medical history of ETOH abuse, hypertension, prostate cancer (last dose of radiation 3 years ago, Lupron), BPH, CABG (x2 vessels in 2004), hyperlipidemia, COPD secondary to former tobacco use who presents with complaint of epigastric pain after drinking heavily over the weekend.  Patient reports he drinks 6-7 beers several times a week. This weekend had 6-7beers and approximately 1 pt of liquor over the weekend.  Patient states he had similar episodes a few weeks ago that resolved with p.o. fluid intake.  Patient reports pain on presentation was a 6 now 3. Affirms associated chills.      Overview/Hospital Course:  Pt admitted for concern of pancreatitis (epigastric pain +elevated lipase; no imaging findings). Kept NPO overnight, IVF. Today pain is resolved, LFTs down-trending and pt with an appetite. Alcoholic vs gallstone pancreatitis as pt does have cholelithiasis. Pt will need to follow up with general surgery as it's possible he could have passed a gallstone. Advised EtOH cessation.       Interval History: Pt feels up to eating today.     Review of Systems   Constitutional:  Negative for activity change, appetite change, fatigue and fever.   HENT:  Negative for congestion, sore throat and trouble swallowing.    Eyes:  Negative for pain and redness.   Respiratory:  Negative for cough, chest tightness, shortness of breath and wheezing.    Cardiovascular:  Negative for chest pain and leg swelling.   Gastrointestinal:  Negative for abdominal distention, abdominal pain,  diarrhea, nausea and vomiting.   Genitourinary:  Negative for dysuria and frequency.   Musculoskeletal:  Negative for back pain and neck pain.   Neurological:  Negative for weakness and headaches.   Psychiatric/Behavioral:  Negative for agitation and confusion.    Objective:     Vital Signs (Most Recent):  Temp: 98.7 °F (37.1 °C) (08/16/22 0722)  Pulse: 68 (08/16/22 0722)  Resp: 18 (08/16/22 0346)  BP: 120/76 (08/16/22 0722)  SpO2: 95 % (08/16/22 0722) Vital Signs (24h Range):  Temp:  [98.7 °F (37.1 °C)-100.3 °F (37.9 °C)] 98.7 °F (37.1 °C)  Pulse:  [68-97] 68  Resp:  [16-20] 18  SpO2:  [93 %-99 %] 95 %  BP: (102-142)/(47-80) 120/76     Weight: 78 kg (172 lb)  Body mass index is 26.15 kg/m².  No intake or output data in the 24 hours ending 08/16/22 0739   Physical Exam  Constitutional:       General: He is not in acute distress.     Appearance: Normal appearance.   HENT:      Head: Normocephalic and atraumatic.      Nose: No congestion or rhinorrhea.      Mouth/Throat:      Mouth: Mucous membranes are moist.   Eyes:      Conjunctiva/sclera: Conjunctivae normal.      Pupils: Pupils are equal, round, and reactive to light.   Cardiovascular:      Rate and Rhythm: Normal rate and regular rhythm.      Heart sounds: No murmur heard.  Pulmonary:      Effort: Pulmonary effort is normal.      Breath sounds: Normal breath sounds. No wheezing.   Abdominal:      General: Bowel sounds are normal. There is no distension.      Palpations: Abdomen is soft.      Tenderness: There is no abdominal tenderness.   Musculoskeletal:         General: No swelling. Normal range of motion.      Cervical back: Normal range of motion and neck supple.      Right lower leg: No edema.      Left lower leg: No edema.   Skin:     General: Skin is warm and dry.      Findings: No rash.   Neurological:      General: No focal deficit present.      Mental Status: He is alert and oriented to person, place, and time.   Psychiatric:         Mood and Affect:  Mood normal.         Behavior: Behavior normal.       Significant Labs: All pertinent labs within the past 24 hours have been reviewed.    Significant Imaging: I have reviewed all pertinent imaging results/findings within the past 24 hours.      Assessment/Plan:      * Alcohol-induced acute pancreatitis without infection or necrosis  -lipase 237, , ; LFTs down trending   -patient's triglyceride on record review was 180 1 month ago  -CT abdomen pelvis with contrast showed normal pancreas, cholelithiasis  -ultrasound of the abdomen showed no abnormality identified of the pancreas, liver with hepatic echogenicity, no evidence of cholecystitis  -advance diet as tolerated      Benign prostatic hyperplasia  -Tamsulosin    Carcinoma of prostate  -s/p 45 sessions of radiation   -      Chronic back pain  -resume home norco as pt no longer NPO      Hyperlipidemia  -atorvastatin     Hypertension  -Losartan       VTE Risk Mitigation (From admission, onward)         Ordered     IP VTE HIGH RISK PATIENT  Once         08/15/22 1339     Place BETTY hose  Until discontinued         08/15/22 1339                Discharge Planning   XIOMARA:      Code Status: DNR   Is the patient medically ready for discharge?:     Reason for patient still in hospital (select all that apply): Patient trending condition and Treatment  Discharge Plan A: Home Health, Home with family                  Thairy G Reyes, DO  Department of Hospital Medicine   [facility]

## 2022-08-16 NOTE — HOSPITAL COURSE
Pt admitted for concern of pancreatitis (epigastric pain +elevated lipase; no imaging findings).  Pain is resolving, LFTs down-trending and pt with an appetite.  Patient reported some mild pain with breath diet therefore have de-escalated once more to full liquid diet which he reported made him feel better.  Alcoholic vs gallstone pancreatitis as pt does have cholelithiasis. Pt will need to follow up with general surgery as it's possible he could have passed a gallstone. Advised EtOH cessation.  Incidental finding of gram-negative jeronimo positive blood cultures.  Ceftriaxone started empirically on 08/16/2022, patient will likely need 7-14 days of treatment depending on culture results.  Pt made acute 8/17.

## 2022-08-16 NOTE — PLAN OF CARE
Problem: Adult Inpatient Plan of Care  Goal: Plan of Care Review  Outcome: Ongoing, Progressing  Flowsheets (Taken 8/16/2022 0020)  Plan of Care Reviewed With: patient  Goal: Absence of Hospital-Acquired Illness or Injury  Outcome: Ongoing, Progressing  Intervention: Identify and Manage Fall Risk  Flowsheets (Taken 8/16/2022 0020)  Safety Promotion/Fall Prevention:   assistive device/personal item within reach   bed alarm set   Fall Risk reviewed with patient/family   nonskid shoes/socks when out of bed   side rails raised x 2   supervised activity   instructed to call staff for mobility  Intervention: Prevent Skin Injury  Flowsheets (Taken 8/16/2022 0020)  Body Position: position changed independently  Intervention: Prevent and Manage VTE (Venous Thromboembolism) Risk  Flowsheets (Taken 8/16/2022 0020)  Activity Management: Ambulated -L4  VTE Prevention/Management: ambulation promoted  Intervention: Prevent Infection  Flowsheets (Taken 8/16/2022 0020)  Infection Prevention:   cohorting utilized   hand hygiene promoted   rest/sleep promoted   single patient room provided     Problem: Pain Acute  Goal: Acceptable Pain Control and Functional Ability  Outcome: Ongoing, Progressing  Intervention: Develop Pain Management Plan  Flowsheets (Taken 8/16/2022 0020)  Pain Management Interventions:   pain management plan reviewed with patient/caregiver   medication offered  Intervention: Prevent or Manage Pain  Flowsheets (Taken 8/16/2022 0020)  Sleep/Rest Enhancement:   awakenings minimized   regular sleep/rest pattern promoted   room darkened  Bowel Elimination Promotion: ambulation promoted     Problem: Fall Injury Risk  Goal: Absence of Fall and Fall-Related Injury  Outcome: Ongoing, Progressing  Intervention: Identify and Manage Contributors  Flowsheets (Taken 8/16/2022 0020)  Self-Care Promotion:   independence encouraged   BADL personal objects within reach   BADL personal routines maintained  Intervention: Promote  Injury-Free Environment  Flowsheets (Taken 8/16/2022 0020)  Safety Promotion/Fall Prevention:   assistive device/personal item within reach   bed alarm set   Fall Risk reviewed with patient/family   nonskid shoes/socks when out of bed   side rails raised x 2   supervised activity   instructed to call staff for mobility

## 2022-08-16 NOTE — SUBJECTIVE & OBJECTIVE
Interval History: Pt feels up to eating today.     Review of Systems   Constitutional:  Negative for activity change, appetite change, fatigue and fever.   HENT:  Negative for congestion, sore throat and trouble swallowing.    Eyes:  Negative for pain and redness.   Respiratory:  Negative for cough, chest tightness, shortness of breath and wheezing.    Cardiovascular:  Negative for chest pain and leg swelling.   Gastrointestinal:  Negative for abdominal distention, abdominal pain, diarrhea, nausea and vomiting.   Genitourinary:  Negative for dysuria and frequency.   Musculoskeletal:  Negative for back pain and neck pain.   Neurological:  Negative for weakness and headaches.   Psychiatric/Behavioral:  Negative for agitation and confusion.    Objective:     Vital Signs (Most Recent):  Temp: 98.7 °F (37.1 °C) (08/16/22 0722)  Pulse: 68 (08/16/22 0722)  Resp: 18 (08/16/22 0346)  BP: 120/76 (08/16/22 0722)  SpO2: 95 % (08/16/22 0722) Vital Signs (24h Range):  Temp:  [98.7 °F (37.1 °C)-100.3 °F (37.9 °C)] 98.7 °F (37.1 °C)  Pulse:  [68-97] 68  Resp:  [16-20] 18  SpO2:  [93 %-99 %] 95 %  BP: (102-142)/(47-80) 120/76     Weight: 78 kg (172 lb)  Body mass index is 26.15 kg/m².  No intake or output data in the 24 hours ending 08/16/22 0739   Physical Exam  Constitutional:       General: He is not in acute distress.     Appearance: Normal appearance.   HENT:      Head: Normocephalic and atraumatic.      Nose: No congestion or rhinorrhea.      Mouth/Throat:      Mouth: Mucous membranes are moist.   Eyes:      Conjunctiva/sclera: Conjunctivae normal.      Pupils: Pupils are equal, round, and reactive to light.   Cardiovascular:      Rate and Rhythm: Normal rate and regular rhythm.      Heart sounds: No murmur heard.  Pulmonary:      Effort: Pulmonary effort is normal.      Breath sounds: Normal breath sounds. No wheezing.   Abdominal:      General: Bowel sounds are normal. There is no distension.      Palpations: Abdomen is  soft.      Tenderness: There is no abdominal tenderness.   Musculoskeletal:         General: No swelling. Normal range of motion.      Cervical back: Normal range of motion and neck supple.      Right lower leg: No edema.      Left lower leg: No edema.   Skin:     General: Skin is warm and dry.      Findings: No rash.   Neurological:      General: No focal deficit present.      Mental Status: He is alert and oriented to person, place, and time.   Psychiatric:         Mood and Affect: Mood normal.         Behavior: Behavior normal.       Significant Labs: All pertinent labs within the past 24 hours have been reviewed.    Significant Imaging: I have reviewed all pertinent imaging results/findings within the past 24 hours.

## 2022-08-17 LAB
ALBUMIN SERPL-MCNC: 3 GM/DL (ref 3.4–4.8)
ALBUMIN/GLOB SERPL: 1.2 RATIO (ref 1.1–2)
ALP SERPL-CCNC: 140 UNIT/L (ref 40–150)
ALT SERPL-CCNC: 188 UNIT/L (ref 0–55)
AST SERPL-CCNC: 90 UNIT/L (ref 5–34)
BASOPHILS # BLD AUTO: 0.01 X10(3)/MCL (ref 0–0.2)
BASOPHILS NFR BLD AUTO: 0.2 %
BILIRUBIN DIRECT+TOT PNL SERPL-MCNC: 3.7 MG/DL
BUN SERPL-MCNC: 9.6 MG/DL (ref 8.4–25.7)
CALCIUM SERPL-MCNC: 8.4 MG/DL (ref 8.8–10)
CHLORIDE SERPL-SCNC: 109 MMOL/L (ref 98–107)
CO2 SERPL-SCNC: 23 MMOL/L (ref 23–31)
CREAT SERPL-MCNC: 0.87 MG/DL (ref 0.73–1.18)
EOSINOPHIL # BLD AUTO: 0.3 X10(3)/MCL (ref 0–0.9)
EOSINOPHIL NFR BLD AUTO: 5.8 %
ERYTHROCYTE [DISTWIDTH] IN BLOOD BY AUTOMATED COUNT: 13.1 % (ref 11.5–17)
GFR SERPLBLD CREATININE-BSD FMLA CKD-EPI: >60 MLS/MIN/1.73/M2
GLOBULIN SER-MCNC: 2.5 GM/DL (ref 2.4–3.5)
GLUCOSE SERPL-MCNC: 96 MG/DL (ref 82–115)
HCT VFR BLD AUTO: 31.8 % (ref 42–52)
HGB BLD-MCNC: 10.6 GM/DL (ref 14–18)
IMM GRANULOCYTES # BLD AUTO: 0.01 X10(3)/MCL (ref 0–0.04)
IMM GRANULOCYTES NFR BLD AUTO: 0.2 %
LYMPHOCYTES # BLD AUTO: 0.52 X10(3)/MCL (ref 0.6–4.6)
LYMPHOCYTES NFR BLD AUTO: 10.1 %
MCH RBC QN AUTO: 31 PG (ref 27–31)
MCHC RBC AUTO-ENTMCNC: 33.3 MG/DL (ref 33–36)
MCV RBC AUTO: 93 FL (ref 80–94)
MONOCYTES # BLD AUTO: 0.42 X10(3)/MCL (ref 0.1–1.3)
MONOCYTES NFR BLD AUTO: 8.1 %
NEUTROPHILS # BLD AUTO: 3.9 X10(3)/MCL (ref 2.1–9.2)
NEUTROPHILS NFR BLD AUTO: 75.6 %
PLATELET # BLD AUTO: 177 X10(3)/MCL (ref 130–400)
PMV BLD AUTO: 9.7 FL (ref 7.4–10.4)
POTASSIUM SERPL-SCNC: 4 MMOL/L (ref 3.5–5.1)
PROT SERPL-MCNC: 5.5 GM/DL (ref 5.8–7.6)
RBC # BLD AUTO: 3.42 X10(6)/MCL (ref 4.7–6.1)
SODIUM SERPL-SCNC: 140 MMOL/L (ref 136–145)
WBC # SPEC AUTO: 5.2 X10(3)/MCL (ref 4.5–11.5)

## 2022-08-17 PROCEDURE — 80053 COMPREHEN METABOLIC PANEL: CPT | Performed by: STUDENT IN AN ORGANIZED HEALTH CARE EDUCATION/TRAINING PROGRAM

## 2022-08-17 PROCEDURE — 85025 COMPLETE CBC W/AUTO DIFF WBC: CPT | Performed by: STUDENT IN AN ORGANIZED HEALTH CARE EDUCATION/TRAINING PROGRAM

## 2022-08-17 PROCEDURE — 25000003 PHARM REV CODE 250: Performed by: STUDENT IN AN ORGANIZED HEALTH CARE EDUCATION/TRAINING PROGRAM

## 2022-08-17 PROCEDURE — 63600175 PHARM REV CODE 636 W HCPCS: Performed by: STUDENT IN AN ORGANIZED HEALTH CARE EDUCATION/TRAINING PROGRAM

## 2022-08-17 PROCEDURE — 84075 ASSAY ALKALINE PHOSPHATASE: CPT | Performed by: STUDENT IN AN ORGANIZED HEALTH CARE EDUCATION/TRAINING PROGRAM

## 2022-08-17 PROCEDURE — 11000001 HC ACUTE MED/SURG PRIVATE ROOM

## 2022-08-17 RX ADMIN — LACTULOSE 10 G: 10 SOLUTION ORAL at 04:08

## 2022-08-17 RX ADMIN — SODIUM CHLORIDE: 9 INJECTION, SOLUTION INTRAVENOUS at 05:08

## 2022-08-17 RX ADMIN — FUROSEMIDE 20 MG: 20 TABLET ORAL at 08:08

## 2022-08-17 RX ADMIN — MELOXICAM 15 MG: 7.5 TABLET ORAL at 08:08

## 2022-08-17 RX ADMIN — MEGESTROL ACETATE 20 MG: 20 TABLET ORAL at 04:08

## 2022-08-17 RX ADMIN — TAMSULOSIN HYDROCHLORIDE 0.8 MG: 0.4 CAPSULE ORAL at 08:08

## 2022-08-17 RX ADMIN — HYDROCODONE BITARTRATE AND ACETAMINOPHEN 1 TABLET: 10; 325 TABLET ORAL at 08:08

## 2022-08-17 RX ADMIN — ASPIRIN 81 MG: 81 TABLET, COATED ORAL at 08:08

## 2022-08-17 RX ADMIN — PANTOPRAZOLE SODIUM 40 MG: 40 TABLET, DELAYED RELEASE ORAL at 08:08

## 2022-08-17 RX ADMIN — ATORVASTATIN CALCIUM 20 MG: 10 TABLET, FILM COATED ORAL at 08:08

## 2022-08-17 RX ADMIN — PRAMIPEXOLE DIHYDROCHLORIDE 0.25 MG: 0.12 TABLET ORAL at 08:08

## 2022-08-17 RX ADMIN — CEFTRIAXONE SODIUM 2 G: 2 INJECTION, POWDER, FOR SOLUTION INTRAMUSCULAR; INTRAVENOUS at 11:08

## 2022-08-17 NOTE — PLAN OF CARE
Problem: Adult Inpatient Plan of Care  Goal: Plan of Care Review  Outcome: Ongoing, Progressing  Flowsheets (Taken 8/17/2022 0105)  Plan of Care Reviewed With: patient  Goal: Absence of Hospital-Acquired Illness or Injury  Outcome: Ongoing, Progressing  Intervention: Identify and Manage Fall Risk  Flowsheets (Taken 8/17/2022 0105)  Safety Promotion/Fall Prevention:   assistive device/personal item within reach   bed alarm set   Fall Risk reviewed with patient/family   nonskid shoes/socks when out of bed   Supervised toileting - stay within arms reach   instructed to call staff for mobility   side rails raised x 2  Intervention: Prevent Skin Injury  Flowsheets (Taken 8/17/2022 0105)  Body Position: position changed independently  Intervention: Prevent and Manage VTE (Venous Thromboembolism) Risk  Flowsheets (Taken 8/17/2022 0105)  Activity Management: Ambulated in room - L4  VTE Prevention/Management: ambulation promoted  Intervention: Prevent Infection  Flowsheets (Taken 8/17/2022 0105)  Infection Prevention:   cohorting utilized   hand hygiene promoted   rest/sleep promoted  Goal: Optimal Comfort and Wellbeing  Outcome: Ongoing, Progressing  Intervention: Monitor Pain and Promote Comfort  Flowsheets (Taken 8/17/2022 0105)  Pain Management Interventions:   pain management plan reviewed with patient/caregiver   medication offered     Problem: Fall Injury Risk  Goal: Absence of Fall and Fall-Related Injury  Outcome: Ongoing, Progressing  Intervention: Identify and Manage Contributors  Flowsheets (Taken 8/17/2022 0105)  Self-Care Promotion:   independence encouraged   BADL personal objects within reach   BADL personal routines maintained  Intervention: Promote Injury-Free Environment  Flowsheets (Taken 8/17/2022 0105)  Safety Promotion/Fall Prevention:   assistive device/personal item within reach   bed alarm set   Fall Risk reviewed with patient/family   nonskid shoes/socks when out of bed   Supervised toileting -  stay within arms reach   instructed to call staff for mobility   side rails raised x 2

## 2022-08-17 NOTE — SUBJECTIVE & OBJECTIVE
Interval History:  No pain however requesting full liquid diet, reported mild pain with brat diet    Review of Systems   Constitutional:  Negative for activity change, appetite change, fatigue and fever.   HENT:  Negative for congestion, sore throat and trouble swallowing.    Eyes:  Negative for pain and redness.   Respiratory:  Negative for cough, chest tightness, shortness of breath and wheezing.    Cardiovascular:  Negative for chest pain and leg swelling.   Gastrointestinal:  Negative for abdominal distention, abdominal pain, diarrhea, nausea and vomiting.   Genitourinary:  Negative for dysuria and frequency.   Musculoskeletal:  Negative for back pain and neck pain.   Neurological:  Negative for weakness and headaches.   Psychiatric/Behavioral:  Negative for agitation and confusion.    Objective:     Vital Signs (Most Recent):  Temp: 98.6 °F (37 °C) (08/17/22 1105)  Pulse: (!) 56 (08/17/22 1105)  Resp: 18 (08/17/22 0843)  BP: 118/67 (08/17/22 1105)  SpO2: 96 % (08/17/22 1105) Vital Signs (24h Range):  Temp:  [97.9 °F (36.6 °C)-98.6 °F (37 °C)] 98.6 °F (37 °C)  Pulse:  [54-74] 56  Resp:  [16-18] 18  SpO2:  [91 %-96 %] 96 %  BP: ()/(47-67) 118/67     Weight: 78 kg (171 lb 15.3 oz)  Body mass index is 26.15 kg/m².    Intake/Output Summary (Last 24 hours) at 8/17/2022 1156  Last data filed at 8/17/2022 0800  Gross per 24 hour   Intake 1491 ml   Output --   Net 1491 ml        Physical Exam  Constitutional:       General: He is not in acute distress.     Appearance: Normal appearance.   HENT:      Head: Normocephalic and atraumatic.      Nose: No congestion or rhinorrhea.      Mouth/Throat:      Mouth: Mucous membranes are moist.   Eyes:      Conjunctiva/sclera: Conjunctivae normal.      Pupils: Pupils are equal, round, and reactive to light.   Cardiovascular:      Rate and Rhythm: Normal rate and regular rhythm.      Heart sounds: No murmur heard.  Pulmonary:      Effort: Pulmonary effort is normal.      Breath  sounds: Normal breath sounds. No wheezing.   Abdominal:      General: Bowel sounds are normal. There is no distension.      Palpations: Abdomen is soft.      Tenderness: There is no abdominal tenderness.   Musculoskeletal:         General: No swelling. Normal range of motion.      Cervical back: Normal range of motion and neck supple.      Right lower leg: No edema.      Left lower leg: No edema.   Skin:     General: Skin is warm and dry.      Findings: No rash.   Neurological:      General: No focal deficit present.      Mental Status: He is alert and oriented to person, place, and time.   Psychiatric:         Mood and Affect: Mood normal.         Behavior: Behavior normal.       Significant Labs: All pertinent labs within the past 24 hours have been reviewed.    Significant Imaging: I have reviewed all pertinent imaging results/findings within the past 24 hours.

## 2022-08-17 NOTE — ASSESSMENT & PLAN NOTE
-vs gallstone pancreatitis  -lipase 237, , ; LFTs down trending   -patient's triglyceride on record review was 180 1 month ago  -CT abdomen pelvis with contrast showed normal pancreas, cholelithiasis  -ultrasound of the abdomen showed no abnormality identified of the pancreas, liver with hepatic echogenicity, no evidence of cholecystitis  -advance diet as tolerated

## 2022-08-17 NOTE — PLAN OF CARE
[facility]  Discharge Reassessment    Primary Care Provider: Chad Moran Jr, MD    Expected Discharge Date:     Reassessment (most recent)     Discharge Reassessment - 08/17/22 1725        Discharge Reassessment    Assessment Type Discharge Planning Reassessment     Did the patient's condition or plan change since previous assessment? No     Discharge Plan discussed with: Spouse/sig other     Name(s) and Number(s) ALF spouse      Communicated XIOMARA with patient/caregiver Date not available/Unable to determine     Discharge Plan A Home with family;Home Health     DME Needed Upon Discharge  none     Discharge Barriers Identified None     Why the patient remains in the hospital Requires continued medical care        Post-Acute Status    Hospital Resources/Appts/Education Provided Provided patient/caregiver with written discharge plan information     Discharge Delays None known at this time

## 2022-08-17 NOTE — ASSESSMENT & PLAN NOTE
-Empiric treatment with ceftriaxone started 8/16  -Consistent with GI as source given presenting symptom   -Follow cultures

## 2022-08-17 NOTE — PROGRESS NOTES
[facility]  [facility] Medicine  Progress Note    Patient Name: Avery Hill Jr.  MRN: [id number]  Patient Class: IP- Inpatient   Admission Date: 8/15/2022  Length of Stay: 0 days  Attending Physician: Thairy G Reyes, DO  Primary Care Provider: Chad Moran Jr, MD        Subjective:     Principal Problem:Alcohol-induced acute pancreatitis without infection or necrosis        HPI:  72-year-old male with a past medical history of ETOH abuse, hypertension, prostate cancer (last dose of radiation 3 years ago, Lupron), BPH, CABG (x2 vessels in 2004), hyperlipidemia, COPD secondary to former tobacco use who presents with complaint of epigastric pain after drinking heavily over the weekend.  Patient reports he drinks 6-7 beers several times a week. This weekend had 6-7beers and approximately 1 pt of liquor over the weekend.  Patient states he had similar episodes a few weeks ago that resolved with p.o. fluid intake.  Patient reports pain on presentation was a 6 now 3. Affirms associated chills.      Overview/Hospital Course:  Pt admitted for concern of pancreatitis (epigastric pain +elevated lipase; no imaging findings).  Pain is resolving, LFTs down-trending and pt with an appetite.  Patient reported some mild pain with breath diet therefore have de-escalated once more to full liquid diet which he reported made him feel better.  Alcoholic vs gallstone pancreatitis as pt does have cholelithiasis. Pt will need to follow up with general surgery as it's possible he could have passed a gallstone. Advised EtOH cessation.  Incidental finding of gram-negative jeronimo positive blood cultures.  Ceftriaxone started empirically on 08/16/2022, patient will likely need 7-14 days of treatment depending on culture results.  Pt made acute 8/17.      Interval History:  No pain however requesting full liquid diet, reported mild pain with brat diet    Review of Systems   Constitutional:  Negative for activity  change, appetite change, fatigue and fever.   HENT:  Negative for congestion, sore throat and trouble swallowing.    Eyes:  Negative for pain and redness.   Respiratory:  Negative for cough, chest tightness, shortness of breath and wheezing.    Cardiovascular:  Negative for chest pain and leg swelling.   Gastrointestinal:  Negative for abdominal distention, abdominal pain, diarrhea, nausea and vomiting.   Genitourinary:  Negative for dysuria and frequency.   Musculoskeletal:  Negative for back pain and neck pain.   Neurological:  Negative for weakness and headaches.   Psychiatric/Behavioral:  Negative for agitation and confusion.    Objective:     Vital Signs (Most Recent):  Temp: 98.6 °F (37 °C) (08/17/22 1105)  Pulse: (!) 56 (08/17/22 1105)  Resp: 18 (08/17/22 0843)  BP: 118/67 (08/17/22 1105)  SpO2: 96 % (08/17/22 1105) Vital Signs (24h Range):  Temp:  [97.9 °F (36.6 °C)-98.6 °F (37 °C)] 98.6 °F (37 °C)  Pulse:  [54-74] 56  Resp:  [16-18] 18  SpO2:  [91 %-96 %] 96 %  BP: ()/(47-67) 118/67     Weight: 78 kg (171 lb 15.3 oz)  Body mass index is 26.15 kg/m².    Intake/Output Summary (Last 24 hours) at 8/17/2022 1156  Last data filed at 8/17/2022 0800  Gross per 24 hour   Intake 1491 ml   Output --   Net 1491 ml        Physical Exam  Constitutional:       General: He is not in acute distress.     Appearance: Normal appearance.   HENT:      Head: Normocephalic and atraumatic.      Nose: No congestion or rhinorrhea.      Mouth/Throat:      Mouth: Mucous membranes are moist.   Eyes:      Conjunctiva/sclera: Conjunctivae normal.      Pupils: Pupils are equal, round, and reactive to light.   Cardiovascular:      Rate and Rhythm: Normal rate and regular rhythm.      Heart sounds: No murmur heard.  Pulmonary:      Effort: Pulmonary effort is normal.      Breath sounds: Normal breath sounds. No wheezing.   Abdominal:      General: Bowel sounds are normal. There is no distension.      Palpations: Abdomen is soft.       Tenderness: There is no abdominal tenderness.   Musculoskeletal:         General: No swelling. Normal range of motion.      Cervical back: Normal range of motion and neck supple.      Right lower leg: No edema.      Left lower leg: No edema.   Skin:     General: Skin is warm and dry.      Findings: No rash.   Neurological:      General: No focal deficit present.      Mental Status: He is alert and oriented to person, place, and time.   Psychiatric:         Mood and Affect: Mood normal.         Behavior: Behavior normal.       Significant Labs: All pertinent labs within the past 24 hours have been reviewed.    Significant Imaging: I have reviewed all pertinent imaging results/findings within the past 24 hours.      Assessment/Plan:      * Alcohol-induced acute pancreatitis without infection or necrosis  -vs gallstone pancreatitis  -lipase 237, , ; LFTs down trending   -patient's triglyceride on record review was 180 1 month ago  -CT abdomen pelvis with contrast showed normal pancreas, cholelithiasis  -ultrasound of the abdomen showed no abnormality identified of the pancreas, liver with hepatic echogenicity, no evidence of cholecystitis  -advance diet as tolerated      Gram-negative bacteremia  -Empiric treatment with ceftriaxone started 8/16  -Consistent with GI as source given presenting symptom   -Follow cultures      Benign prostatic hyperplasia  -Tamsulosin    Carcinoma of prostate  -s/p 45 sessions of radiation         Chronic back pain  -resume home norco as pt no longer NPO      Hyperlipidemia  -atorvastatin     Hypertension  -Losartan       VTE Risk Mitigation (From admission, onward)         Ordered     IP VTE HIGH RISK PATIENT  Once         08/15/22 1339     Place BETTY hose  Until discontinued         08/15/22 1339                Discharge Planning   XIOMARA:      Code Status: DNR   Is the patient medically ready for discharge?:     Reason for patient still in hospital (select all that apply):  Laboratory test and Treatment  Discharge Plan A: Home Health, Home with family                  Thairy G Reyes, DO  Department of Hospital Medicine   [facility]

## 2022-08-17 NOTE — PROGRESS NOTES
[facility]  [facility] Medicine  Progress Note    Patient Name: Avery Hill Jr.  MRN: [id number]  Patient Class: IP- Inpatient   Admission Date: 8/15/2022  Length of Stay: 0 days  Attending Physician: Thairy G Reyes, DO  Primary Care Provider: Chad Moran Jr, MD        Subjective:     Principal Problem:Alcohol-induced acute pancreatitis without infection or necrosis        HPI:  72-year-old male with a past medical history of ETOH abuse, hypertension, prostate cancer (last dose of radiation 3 years ago, Lupron), BPH, CABG (x2 vessels in 2004), hyperlipidemia, COPD secondary to former tobacco use who presents with complaint of epigastric pain after drinking heavily over the weekend.  Patient reports he drinks 6-7 beers several times a week. This weekend had 6-7beers and approximately 1 pt of liquor over the weekend.  Patient states he had similar episodes a few weeks ago that resolved with p.o. fluid intake.  Patient reports pain on presentation was a 6 now 3. Affirms associated chills.      Overview/Hospital Course:  Pt admitted for concern of pancreatitis (epigastric pain +elevated lipase; no imaging findings).  Pain is resolving, LFTs down-trending and pt with an appetite.  Patient reported some mild pain with breath diet therefore have de-escalated once more to full liquid diet which he reported made him feel better.  Alcoholic vs gallstone pancreatitis as pt does have cholelithiasis. Pt will need to follow up with general surgery as it's possible he could have passed a gallstone. Advised EtOH cessation.  Incidental finding of gram-negative jeronimo positive blood cultures.  Ceftriaxone started empirically on 08/16/2022, patient will likely need 7-14 days of treatment depending on culture results.  Pt made acute 8/17.      No new subjective & objective note has been filed under this hospital service since the last note was generated.      Assessment/Plan:      * Alcohol-induced  acute pancreatitis without infection or necrosis  -vs gallstone pancreatitis  -lipase 237, , ; LFTs down trending   -patient's triglyceride on record review was 180 1 month ago  -CT abdomen pelvis with contrast showed normal pancreas, cholelithiasis  -ultrasound of the abdomen showed no abnormality identified of the pancreas, liver with hepatic echogenicity, no evidence of cholecystitis  -advance diet as tolerated      Gram-negative bacteremia  -Empiric treatment with ceftriaxone started 8/16  -Consistent with GI as source given presenting symptom   -Follow cultures      Benign prostatic hyperplasia  -Tamsulosin    Carcinoma of prostate  -s/p 45 sessions of radiation         Chronic back pain  -resume home norco as pt no longer NPO      Hyperlipidemia  -atorvastatin     Hypertension  -Losartan     Critical care time 40 minutes  VTE Risk Mitigation (From admission, onward)         Ordered     IP VTE HIGH RISK PATIENT  Once         08/15/22 1339     Place BETTY hose  Until discontinued         08/15/22 1339                Discharge Planning   XIOMARA:      Code Status: DNR   Is the patient medically ready for discharge?:     Reason for patient still in hospital (select all that apply): Patient new problem, Laboratory test and Treatment  Discharge Plan A: Home Health, Home with family                  Thairy G Reyes, DO  Department of Hospital Medicine   [facility]

## 2022-08-17 NOTE — PLAN OF CARE
Problem: Adult Inpatient Plan of Care  Goal: Plan of Care Review  8/17/2022 1809 by Gene Ocampo RN  Outcome: Ongoing, Progressing  Flowsheets (Taken 8/17/2022 1809)  Plan of Care Reviewed With: patient  8/17/2022 1654 by Gene Ocampo RN  Outcome: Ongoing, Progressing  Goal: Patient-Specific Goal (Individualized)  8/17/2022 1809 by Gene Ocampo RN  Outcome: Ongoing, Progressing  8/17/2022 1654 by Gene Ocampo RN  Outcome: Ongoing, Progressing  Goal: Absence of Hospital-Acquired Illness or Injury  8/17/2022 1809 by Gene Ocampo RN  Outcome: Ongoing, Progressing  8/17/2022 1654 by Gene Ocampo RN  Outcome: Ongoing, Progressing  Intervention: Identify and Manage Fall Risk  Flowsheets (Taken 8/17/2022 1809)  Safety Promotion/Fall Prevention:   assistive device/personal item within reach   nonskid shoes/socks when out of bed   side rails raised x 2   medications reviewed  Intervention: Prevent Skin Injury  Flowsheets (Taken 8/17/2022 1809)  Body Position: position changed independently  Skin Protection:   adhesive use limited   incontinence pads utilized  Intervention: Prevent and Manage VTE (Venous Thromboembolism) Risk  Flowsheets (Taken 8/17/2022 1809)  Activity Management: Ambulated -L4  VTE Prevention/Management: remove, assess skin, and reapply sequential compression device  Intervention: Prevent Infection  Flowsheets (Taken 8/17/2022 1809)  Infection Prevention:   hand hygiene promoted   single patient room provided   rest/sleep promoted  Goal: Optimal Comfort and Wellbeing  8/17/2022 1809 by Gene Ocampo RN  Outcome: Ongoing, Progressing  8/17/2022 1654 by Gene Ocampo RN  Outcome: Ongoing, Progressing  Intervention: Monitor Pain and Promote Comfort  Flowsheets (Taken 8/17/2022 1809)  Pain Management Interventions:   around-the-clock dosing utilized   pain management plan reviewed with patient/caregiver  Intervention: Provide Person-Centered Care  Flowsheets (Taken 8/17/2022  1809)  Trust Relationship/Rapport:   care explained   choices provided   emotional support provided   thoughts/feelings acknowledged  Goal: Readiness for Transition of Care  8/17/2022 1809 by Gene Ocampo RN  Outcome: Ongoing, Progressing  8/17/2022 1654 by Gene Ocampo RN  Outcome: Ongoing, Progressing  Intervention: Mutually Develop Transition Plan  Flowsheets (Taken 8/17/2022 1809)  Equipment Currently Used at Home: none     Problem: Pain Acute  Goal: Acceptable Pain Control and Functional Ability  8/17/2022 1809 by Gene Ocampo RN  Outcome: Ongoing, Progressing  8/17/2022 1654 by Gene Ocampo RN  Outcome: Ongoing, Progressing  Intervention: Develop Pain Management Plan  Flowsheets (Taken 8/17/2022 1809)  Pain Management Interventions:   around-the-clock dosing utilized   pain management plan reviewed with patient/caregiver  Intervention: Prevent or Manage Pain  Flowsheets (Taken 8/17/2022 1809)  Sleep/Rest Enhancement:   awakenings minimized   consistent schedule promoted   family presence promoted   regular sleep/rest pattern promoted  Sensory Stimulation Regulation:   care clustered   lighting decreased  Bowel Elimination Promotion:   adequate fluid intake promoted   ambulation promoted   diet adjusted  Medication Review/Management: medications reviewed     Problem: Fall Injury Risk  Goal: Absence of Fall and Fall-Related Injury  8/17/2022 1809 by Gene Ocampo RN  Outcome: Ongoing, Progressing  8/17/2022 1654 by Gene Ocampo RN  Outcome: Ongoing, Progressing  Intervention: Identify and Manage Contributors  Flowsheets (Taken 8/17/2022 1809)  Self-Care Promotion:   independence encouraged   BADL personal objects within reach  Medication Review/Management: medications reviewed  Intervention: Promote Injury-Free Environment  Flowsheets (Taken 8/17/2022 1809)  Safety Promotion/Fall Prevention:   assistive device/personal item within reach   nonskid shoes/socks when out of bed   side rails  raised x 2   medications reviewed

## 2022-08-18 LAB
BACTERIA BLD CULT: ABNORMAL
GRAM STN SPEC: ABNORMAL

## 2022-08-18 PROCEDURE — 63600175 PHARM REV CODE 636 W HCPCS: Performed by: STUDENT IN AN ORGANIZED HEALTH CARE EDUCATION/TRAINING PROGRAM

## 2022-08-18 PROCEDURE — 25000003 PHARM REV CODE 250: Performed by: STUDENT IN AN ORGANIZED HEALTH CARE EDUCATION/TRAINING PROGRAM

## 2022-08-18 PROCEDURE — 11000001 HC ACUTE MED/SURG PRIVATE ROOM

## 2022-08-18 RX ADMIN — ATORVASTATIN CALCIUM 20 MG: 10 TABLET, FILM COATED ORAL at 09:08

## 2022-08-18 RX ADMIN — PANTOPRAZOLE SODIUM 40 MG: 40 TABLET, DELAYED RELEASE ORAL at 09:08

## 2022-08-18 RX ADMIN — MEGESTROL ACETATE 20 MG: 20 TABLET ORAL at 04:08

## 2022-08-18 RX ADMIN — MELOXICAM 15 MG: 7.5 TABLET ORAL at 09:08

## 2022-08-18 RX ADMIN — LOSARTAN POTASSIUM 50 MG: 50 TABLET, FILM COATED ORAL at 09:08

## 2022-08-18 RX ADMIN — FUROSEMIDE 20 MG: 20 TABLET ORAL at 09:08

## 2022-08-18 RX ADMIN — SODIUM CHLORIDE: 9 INJECTION, SOLUTION INTRAVENOUS at 02:08

## 2022-08-18 RX ADMIN — PRAMIPEXOLE DIHYDROCHLORIDE 0.25 MG: 0.12 TABLET ORAL at 09:08

## 2022-08-18 RX ADMIN — ASPIRIN 81 MG: 81 TABLET, COATED ORAL at 09:08

## 2022-08-18 RX ADMIN — HYDROCODONE BITARTRATE AND ACETAMINOPHEN 1 TABLET: 10; 325 TABLET ORAL at 09:08

## 2022-08-18 RX ADMIN — TAMSULOSIN HYDROCHLORIDE 0.8 MG: 0.4 CAPSULE ORAL at 09:08

## 2022-08-18 RX ADMIN — CEFTRIAXONE SODIUM 2 G: 2 INJECTION, POWDER, FOR SOLUTION INTRAMUSCULAR; INTRAVENOUS at 11:08

## 2022-08-18 RX ADMIN — LACTULOSE 10 G: 10 SOLUTION ORAL at 04:08

## 2022-08-18 NOTE — PLAN OF CARE
Problem: Adult Inpatient Plan of Care  Goal: Plan of Care Review  Outcome: Ongoing, Progressing  Flowsheets (Taken 8/18/2022 1152)  Plan of Care Reviewed With: patient  Goal: Patient-Specific Goal (Individualized)  Outcome: Ongoing, Progressing  Flowsheets (Taken 8/18/2022 1152)  Anxieties, Fears or Concerns: none  Individualized Care Needs: pain managment  Patient-Specific Goals (Include Timeframe): no pain by d/c  Goal: Absence of Hospital-Acquired Illness or Injury  Outcome: Ongoing, Progressing  Intervention: Identify and Manage Fall Risk  Flowsheets (Taken 8/18/2022 1152)  Safety Promotion/Fall Prevention:   assistive device/personal item within reach   bed alarm set   family to remain at bedside   nonskid shoes/socks when out of bed  Intervention: Prevent Skin Injury  Flowsheets (Taken 8/18/2022 1152)  Body Position: position changed independently  Skin Protection:   adhesive use limited   incontinence pads utilized  Intervention: Prevent and Manage VTE (Venous Thromboembolism) Risk  Flowsheets (Taken 8/18/2022 1152)  Activity Management: Ambulated in room - L4  VTE Prevention/Management:   bleeding precations maintained   bleeding risk assessed   remove, assess skin, and reapply compression stockings  Range of Motion: active ROM (range of motion) encouraged  Intervention: Prevent Infection  Flowsheets (Taken 8/18/2022 1152)  Infection Prevention: hand hygiene promoted  Goal: Optimal Comfort and Wellbeing  Outcome: Ongoing, Progressing  Intervention: Monitor Pain and Promote Comfort  Flowsheets (Taken 8/18/2022 1152)  Pain Management Interventions: medication offered  Intervention: Provide Person-Centered Care  Flowsheets (Taken 8/18/2022 1152)  Trust Relationship/Rapport:   care explained   questions encouraged   questions answered  Goal: Readiness for Transition of Care  Outcome: Ongoing, Progressing  Intervention: Mutually Develop Transition Plan  Flowsheets (Taken 8/18/2022 1152)  Equipment Currently  Used at Home: none  Transportation Anticipated: family or friend will provide  Communicated XIOMARA with patient/caregiver: Date not available/Unable to determine  Do you expect to return to your current living situation?: Yes  Do you have help at home or someone to help you manage your care at home?: Yes  Readmission within 30 days?: No  Do you currently have service(s) that help you manage your care at home?: No  Is the pt/caregiver preference to resume services with current agency: No     Problem: Pain Acute  Goal: Acceptable Pain Control and Functional Ability  Outcome: Ongoing, Progressing  Intervention: Develop Pain Management Plan  Flowsheets (Taken 8/18/2022 1152)  Pain Management Interventions: medication offered  Intervention: Prevent or Manage Pain  Flowsheets (Taken 8/18/2022 1152)  Sleep/Rest Enhancement: awakenings minimized  Sensory Stimulation Regulation: care clustered  Bowel Elimination Promotion: adequate fluid intake promoted  Medication Review/Management: medications reviewed  Intervention: Optimize Psychosocial Wellbeing  Flowsheets (Taken 8/18/2022 1152)  Supportive Measures:   active listening utilized   self-care encouraged  Diversional Activities: television     Problem: Fall Injury Risk  Goal: Absence of Fall and Fall-Related Injury  Outcome: Ongoing, Progressing  Intervention: Identify and Manage Contributors  Flowsheets (Taken 8/18/2022 1152)  Self-Care Promotion: independence encouraged  Medication Review/Management: medications reviewed  Intervention: Promote Injury-Free Environment  Flowsheets (Taken 8/18/2022 1152)  Safety Promotion/Fall Prevention:   assistive device/personal item within reach   bed alarm set   family to remain at bedside   nonskid shoes/socks when out of bed

## 2022-08-18 NOTE — PROGRESS NOTES
Hospital Medicine  Progress Note    Patient Name: Avery Hill Jr.  MRN: [id number]  Status: IP- Inpatient   Admission Date: 8/15/2022  Length of Stay: 1      CC: hospital follow-up for acute pancreatitis       SUBJECTIVE   72-year-old male with a history that includes ETOH abuse presented to ED with epigastric pain after drinking heavily over the weekend.  Patient reports he drinks 6-7 beers several times a week. Over the weekend had 6-7 beers and approximately 1 pt of liquor.  Patient states he had similar episodes a few weeks prior that resolved with PO fluids. Patient admitted for acute pancreatitis; lipase 237 (ULM , , , .  US abd noted cholelithiasis without evidence of cholecystitis or choledocholithiasis.  Patient was treated conservatively.  LFTs were down-trending and patient started on clears.  Blood culture from admit subsequently noted GNRs.  Patient started on IV Ceftriaxone.  CT abd obtained, noting normal pancreas, again with uncomplicated cholelithiasis  Given GNR bacteremia, and normal pancrease, concern for resolved choledocholithiasis, possible gallstone pancreatitis vs alcoholic.  Patient will need to follow up with general surgery on discharge, for cholecystectomy evaluation. EtOH cessation advised as well.      Today: Patient seen and examined at bedside, and chart reviewed.  He has been tolerating clears without issues.  Remains afebrile, without any abd pain.      MEDICATIONS   Scheduled   aspirin  81 mg Oral Daily    atorvastatin  20 mg Oral Daily    cefTRIAXone (ROCEPHIN) IVPB  2 g Intravenous Q24H    furosemide  20 mg Oral Daily    lactulose 10 gram/15 ml  10 g Oral Daily    losartan  50 mg Oral Daily    megestroL  20 mg Oral Daily    meloxicam  15 mg Oral Daily    pantoprazole  40 mg Oral Daily    pramipexole  0.25 mg Oral Daily    tamsulosin  2 capsule Oral Daily    tiZANidine  4 mg Oral BID     Continuous Infusions   sodium chloride 0.9% 100 mL/hr at  08/18/22 0223     PRN  acetaminophen, HYDROcodone-acetaminophen, labetalol, lorazepam, morphine, promethazine       PHYSICAL EXAM   VITALS: T 97.6 °F (36.4 °C)   BP (!) 120/54   P 64   RR 18   O2 95 %    GENERAL: Awake and in NAD  LUNGS: CTA B/L  CVS: Normal rate  GI/: Soft, NT, bowel sounds positive.  EXTREMITIES: No peripheral edema  NEURO: AAOx3  PSYCH: Cooperative      LABS   CBC  Recent Labs     08/17/22  0555   WBC 5.2   HGB 10.6*   HCT 31.8*         CHEM  Recent Labs     08/17/22  0555      K 4.0   CO2 23   BUN 9.6   CREATININE 0.87   CALCIUM 8.4*   BILITOT 3.7*   AST 90*   *   ALKPHOS 140   ALBUMIN 3.0*          MICROBIOLOGY     Microbiology Results (last 7 days)     Procedure Component Value Units Date/Time    Blood Culture [[id number]]  (Normal) Collected: 08/16/22 2052    Order Status: Completed Specimen: Blood from Arm, Right Updated: 08/18/22 1304     CULTURE, BLOOD ([facility]) No Growth At 24 Hours    Blood Culture [[id number]]  (Normal) Collected: 08/15/22 1755    Order Status: Completed Specimen: Blood Updated: 08/18/22 1300     CULTURE, BLOOD ([facility]) No Growth At 48 Hours    Blood Culture [[id number]]  (Abnormal)  (Susceptibility) Collected: 08/15/22 1755    Order Status: Completed Specimen: Blood Updated: 08/18/22 0749     CULTURE, BLOOD ([facility]) Klebsiella oxytoca     GRAM STAIN Many Gram Negative Rods      1 of 2 Aerobic bottles positive       Seen in gram stain of broth only        ASSESSMENT & PLAN   Acute pancreatitis, gallstone vs alcoholic   - advance to soft diet and monitor  - will need surgery follow up to evaluate for cholecystectomy     Klebsiella bacteremia, ? ingrid angitis  - Continue IV Ceftriaxone for now, started 8/16  - will need at least 10 days of antibiotics for bacteremia  - can transition to oral if tolerating soft diet     Benign prostatic hyperplasia  - continu Flomax       Chronic back pain  -resume home analgesics      Disposition: Home in next 24-48hrs if stable  and tolerating diet.    Prophylaxis: B/L SCDs        Emil Barragan MD  [facility]  08/18/2022

## 2022-08-18 NOTE — PLAN OF CARE
Problem: Pain Acute  Goal: Acceptable Pain Control and Functional Ability  Outcome: Ongoing, Progressing  Intervention: Develop Pain Management Plan  Flowsheets (Taken 8/18/2022 0040)  Pain Management Interventions:   care clustered   diversional activity provided   pain management plan reviewed with patient/caregiver   medication offered but refused   quiet environment facilitated  Intervention: Prevent or Manage Pain  Flowsheets (Taken 8/18/2022 0040)  Sleep/Rest Enhancement:   awakenings minimized   regular sleep/rest pattern promoted   noise level reduced   relaxation techniques promoted   room darkened  Sensory Stimulation Regulation:   care clustered   lighting decreased   quiet environment promoted  Bowel Elimination Promotion:   adequate fluid intake promoted   ambulation promoted   commode/bedpan at bedside   privacy promoted   diet adjusted  Medication Review/Management: medications reviewed  Intervention: Optimize Psychosocial Wellbeing  Flowsheets (Taken 8/18/2022 0040)  Supportive Measures:   active listening utilized   relaxation techniques promoted   self-care encouraged   positive reinforcement provided   verbalization of feelings encouraged  Diversional Activities: television     Problem: Fall Injury Risk  Goal: Absence of Fall and Fall-Related Injury  Outcome: Ongoing, Progressing  Intervention: Identify and Manage Contributors  Flowsheets (Taken 8/18/2022 0040)  Self-Care Promotion: independence encouraged  Medication Review/Management: medications reviewed  Intervention: Promote Injury-Free Environment  Flowsheets (Taken 8/18/2022 0040)  Safety Promotion/Fall Prevention:   assistive device/personal item within reach   bed alarm set   commode/urinal/bedpan at bedside   diversional activities provided   medications reviewed   nonskid shoes/socks when out of bed   side rails raised x 2   instructed to call staff for mobility

## 2022-08-18 NOTE — PROGRESS NOTES
"[facility]  Adult Nutrition  Progress Note    SUMMARY       Recommendations    Recommendation/Intervention: 1. continue soft and bite sized  Goals: Meet 50-75% of nutrition needs upon discharged  Nutrition Goal Status: progressing towards goal    Assessment and Plan    No new Assessment & Plan notes have been filed under this hospital service since the last note was generated.  Service: Nutrition       Malnutrition Assessment                                       Reason for Assessment    Reason For Assessment: RD follow-up  Diagnosis: other (see comments) (Alcohol-induced acute pancreatitis without infection or necrosis 8/15/2022    Benign prostatic hyperplasia 8/15/2022    Carcinoma of prostate 8/15/2022    Chronic back pain 8/15/2022    Hyperlipidemia 8/15/2022    Hypertension)  General Information Comments: 8/18/22: Pt feeling better, less abd pain. Pt's diet advanced to  soft and bite sized. Pt intake is good, patient reports. Family present. Marked menus with patient.    Nutrition Risk Screen    Nutrition Risk Screen: no indicators present    Nutrition/Diet History         Anthropometrics    Temp: 97.6 °F (36.4 °C)  Height Method: Stated  Height: 5' 8" (172.7 cm)  Height (inches): 68 in  Weight Method: Standard Scale  Weight: 78 kg (171 lb 15.3 oz)  Weight (lb): 171.96 lb  Ideal Body Weight (IBW), Male: 154 lb  % Ideal Body Weight, Male (lb): 111.66 %  BMI (Calculated): 26.2  BMI Grade: 25 - 29.9 - overweight       Lab/Procedures/Meds    Pertinent Labs Reviewed: reviewed  Pertinent Labs Comments: 8/17/22: Alb 3.0 L, TP 5.5 L, AST 90 H,  H  Pertinent Medications Reviewed: reviewed  Pertinent Medications Comments: furosemide    Physical Findings/Assessment         Estimated/Assessed Needs    Weight Used For Calorie Calculations: 78 kg (171 lb 15.3 oz)  Energy Calorie Requirements (kcal): 1950 (25 kcal/kg/CBW)  Energy Need Method: Kcal/kg  Protein Requirements: 78.16 (1.0 " gm/kg/CBW)  Weight Used For Protein Calculations: 78 kg (171 lb 15.3 oz)     Estimated Fluid Requirement Method: RDA Method  RDA Method (mL): 1950         Nutrition Prescription Ordered    Current Diet Order: soft and bite sized    Evaluation of Received Nutrient/Fluid Intake    Comments: 100% of breakfast  Tolerance: tolerating      Nutrition Risk    Level of Risk/Frequency of Follow-up: low     Monitor and Evaluation    Food and Nutrient Intake: food and beverage intake  Food and Nutrient Adminstration: diet order  Biochemical Data, Medical Tests and Procedures: electrolyte and renal panel, glucose/endocrine profile     Nutrition Follow-Up    RD Follow-up?: Yes

## 2022-08-19 VITALS
HEART RATE: 57 BPM | SYSTOLIC BLOOD PRESSURE: 156 MMHG | TEMPERATURE: 99 F | RESPIRATION RATE: 19 BRPM | OXYGEN SATURATION: 92 % | WEIGHT: 171.94 LBS | DIASTOLIC BLOOD PRESSURE: 80 MMHG | BODY MASS INDEX: 26.06 KG/M2 | HEIGHT: 68 IN

## 2022-08-19 PROBLEM — C61 CARCINOMA OF PROSTATE: Status: RESOLVED | Noted: 2022-08-15 | Resolved: 2022-08-19

## 2022-08-19 PROBLEM — K85.10 ACUTE GALLSTONE PANCREATITIS: Status: RESOLVED | Noted: 2022-08-19 | Resolved: 2022-08-19

## 2022-08-19 PROBLEM — K85.10 ACUTE GALLSTONE PANCREATITIS: Status: ACTIVE | Noted: 2022-08-19

## 2022-08-19 LAB
ALBUMIN SERPL-MCNC: 3.1 GM/DL (ref 3.4–4.8)
ALBUMIN/GLOB SERPL: 1.1 RATIO (ref 1.1–2)
ALP SERPL-CCNC: 183 UNIT/L (ref 40–150)
ALT SERPL-CCNC: 119 UNIT/L (ref 0–55)
AST SERPL-CCNC: 46 UNIT/L (ref 5–34)
BILIRUBIN DIRECT+TOT PNL SERPL-MCNC: 1.5 MG/DL
BUN SERPL-MCNC: 9.1 MG/DL (ref 8.4–25.7)
CALCIUM SERPL-MCNC: 8.9 MG/DL (ref 8.8–10)
CHLORIDE SERPL-SCNC: 112 MMOL/L (ref 98–107)
CO2 SERPL-SCNC: 23 MMOL/L (ref 23–31)
CREAT SERPL-MCNC: 1.06 MG/DL (ref 0.73–1.18)
GFR SERPLBLD CREATININE-BSD FMLA CKD-EPI: >60 MLS/MIN/1.73/M2
GLOBULIN SER-MCNC: 2.7 GM/DL (ref 2.4–3.5)
GLUCOSE SERPL-MCNC: 96 MG/DL (ref 82–115)
POTASSIUM SERPL-SCNC: 4.5 MMOL/L (ref 3.5–5.1)
PROT SERPL-MCNC: 5.8 GM/DL (ref 5.8–7.6)
SODIUM SERPL-SCNC: 143 MMOL/L (ref 136–145)

## 2022-08-19 PROCEDURE — 80053 COMPREHEN METABOLIC PANEL: CPT | Performed by: INTERNAL MEDICINE

## 2022-08-19 PROCEDURE — 25000003 PHARM REV CODE 250: Performed by: STUDENT IN AN ORGANIZED HEALTH CARE EDUCATION/TRAINING PROGRAM

## 2022-08-19 RX ORDER — AMOXICILLIN AND CLAVULANATE POTASSIUM 875; 125 MG/1; MG/1
1 TABLET, FILM COATED ORAL 2 TIMES DAILY
Qty: 22 TABLET | Refills: 0 | Status: SHIPPED | OUTPATIENT
Start: 2022-08-19 | End: 2022-08-30

## 2022-08-19 RX ADMIN — PANTOPRAZOLE SODIUM 40 MG: 40 TABLET, DELAYED RELEASE ORAL at 09:08

## 2022-08-19 RX ADMIN — FUROSEMIDE 20 MG: 20 TABLET ORAL at 09:08

## 2022-08-19 RX ADMIN — ATORVASTATIN CALCIUM 20 MG: 10 TABLET, FILM COATED ORAL at 09:08

## 2022-08-19 RX ADMIN — LOSARTAN POTASSIUM 50 MG: 50 TABLET, FILM COATED ORAL at 09:08

## 2022-08-19 RX ADMIN — MELOXICAM 15 MG: 7.5 TABLET ORAL at 09:08

## 2022-08-19 RX ADMIN — HYDROCODONE BITARTRATE AND ACETAMINOPHEN 1 TABLET: 10; 325 TABLET ORAL at 09:08

## 2022-08-19 RX ADMIN — TAMSULOSIN HYDROCHLORIDE 0.8 MG: 0.4 CAPSULE ORAL at 09:08

## 2022-08-19 RX ADMIN — PRAMIPEXOLE DIHYDROCHLORIDE 0.25 MG: 0.12 TABLET ORAL at 09:08

## 2022-08-19 RX ADMIN — ASPIRIN 81 MG: 81 TABLET, COATED ORAL at 09:08

## 2022-08-19 NOTE — DISCHARGE SUMMARY
Hospital Medicine  Discharge Summary    Patient Name: Avery Hill Jr.  MRN: [id number]  Admit Date: 8/15/2022  Discharge Date: 8/19/2022   Status: IP- Inpatient   Length of Stay: 2      PHYSICIANS   Admitting Physician: Thairy G Reyes, DO  Discharging Physician: Emil Barragan MD.  Primary Care Physician: Chad Moran Jr, MD  Consults: None      DISCHARGE DIAGNOSES   Acute pancreatitis, suspected gallstone related   Klebsiella bacteremia, suspected s/t choleangitis  Benign prostatic hyperplasia  Alcohol abuse  Chronic back pain      PROCEDURES   None      HOSPITAL COURSE    72-year-old male with a history that includes ETOH abuse presented to ED with epigastric pain after drinking heavily over the weekend.  Patient reports he drinks 6-7 beers several times a week. Over the weekend had 6-7 beers and approximately 1 pt of liquor.  Patient states he had similar episodes a few weeks prior that resolved with PO fluids. Patient admitted for acute pancreatitis; lipase 237 (ULM , , , .  US abd noted cholelithiasis without evidence of cholecystitis or choledocholithiasis.  Patient was treated conservatively.  LFTs were down-trending and patient started on clears.  Blood culture from admit subsequently noted GNRs.  Patient started on IV Ceftriaxone.  CT abd obtained, noting normal pancreas, again with uncomplicated cholelithiasis  Given GNR bacteremia, and normal pancrease, concern for resolved choledocholithiasis, possible gallstone pancreatitis vs alcoholic.  Patient will need to follow up with general surgery on discharge, for cholecystectomy evaluation. EtOH cessation advised as well.  Patient tolerating soft diet without any issues; no nausea, or abd pain.  He is stable for discharge home.  Advised to discuss with PCP about referral to local surgeon for evaluation of possible cholecystectomy, as gallstones are suspected cause of presentation.  Also will need 10 of antibiotic therapy for  Klebsiella bacteremia, though can transition to oral Augmentin.      STATUS  Improved    DISPOSITION  Discharge to home    DIET  Cardiac    ACTIVITY  As tolerated  Advised to avoid alcohol    FOLLOW-UP      Chad Moran Jr, MD. Schedule an appointment as soon as possible for a visit in 1 week(s).    Specialty: Family Medicine  Why: Note: discuss referral to general surgeon for possible cholecystectomy evaluation  Follow up scheduled for 8/26/22 at 10:00AM - Spoke to Valeriano  Contact information:  [address]  [address]  [facility] [address] [phone]                 DISCHARGE MEDICATION RECONCILIATION     PRESCRIBED   amoxicillin-clavulanate 875-125mg 875-125 mg per tablet  Commonly known as: AUGMENTIN  Take 1 tablet by mouth 2 (two) times daily. for 11 days        CONTINUE    aspirin 325 MG tablet     atorvastatin 20 MG tablet  Commonly known as: LIPITOR     bicalutamide 50 MG Tab  Commonly known as: CASODEX     clomiPHENE 50 mg tablet  Commonly known as: CLOMID     furosemide 20 MG tablet  Commonly known as: LASIX     Hydrocodone  mg per tablet  Commonly known as: NORCO     lactulose 10 gram/15 mL solution  Commonly known as: CHRONULAC     losartan 50 MG tablet  Commonly known as: COZAAR     megestroL 20 MG Tab  Commonly known as: MEGACE     meloxicam 15 MG tablet  Commonly known as: MOBIC     ondansetron 8 MG tablet  Commonly known as: ZOFRAN     pantoprazole 40 MG tablet  Commonly known as: PROTONIX     pramipexole 0.5 MG tablet  Commonly known as: MIRAPEX     tamsulosin 0.4 mg Cap  Commonly known as: FLOMAX     tizanidine 4 MG tablet  Commonly known as: ZANAFLEX           These medications were sent to   [facility] [address] [facility] [address]  [facility] [address]    Phone: [phone]   · amoxicillin-clavulanate 875-125mg 875-125 mg per tablet       PHYSICAL EXAM   VITALS: T 98.8 °F (37.1 °C)   BP (!) 156/80   P (!) 57   RR 19   O2 (!) 92 %    GENERAL: Awake and in NAD  LUNGS: CTA  B/L  CVS: Normal rate  GI/: Soft, NT, bowel sounds positive.  EXTREMITIES: No peripheral edema  NEURO: AAOx3  PSYCH: Cooperative        Discharge time: 33 minutes     Emil Barragan MD  [facility]  08/19/2022        DIAGNOSITCS   CBC:   Recent Labs   Lab 08/15/22  0930 08/16/22  0545 08/17/22  0555   WBC 9.7 6.7 5.2   HGB 12.6* 11.1* 10.6*   HCT 37.1* 33.4* 31.8*    189 177     CMP:   Recent Labs   Lab 08/16/22  0545 08/17/22  0555 08/19/22  0550   CALCIUM 8.3* 8.4* 8.9   ALBUMIN 3.1* 3.0* 3.1*    140 143   K 4.1 4.0 4.5   CO2 23 23 23   BUN 11.9 9.6 9.1   CREATININE 0.83 0.87 1.06   ALKPHOS 130 140 183*   * 188* 119*   * 90* 46*   BILITOT 7.2* 3.7* 1.5     Estimated Creatinine Clearance: 61.8 mL/min (based on SCr of 1.06 mg/dL).    Recent Labs   Lab 08/15/22  0930   AMYLASE 191*   LIPASE 237*        Microbiology Results (last 7 days)     Procedure Component Value Units Date/Time    Blood Culture [[id number]]  (Normal) Collected: 08/16/22 2052    Order Status: Completed Specimen: Blood from Arm, Right Updated: 08/18/22 1304     CULTURE, BLOOD ([facility]) No Growth At 24 Hours    Blood Culture [[id number]]  (Normal) Collected: 08/15/22 1755    Order Status: Completed Specimen: Blood Updated: 08/18/22 1300     CULTURE, BLOOD ([facility]) No Growth At 48 Hours    Blood Culture [[id number]]  (Abnormal)  (Susceptibility) Collected: 08/15/22 1755    Order Status: Completed Specimen: Blood Updated: 08/18/22 0749     CULTURE, BLOOD ([facility]) Klebsiella oxytoca     GRAM STAIN Many Gram Negative Rods      1 of 2 Aerobic bottles positive       Seen in gram stain of broth only             CT Abdomen Pelvis With Contrast  Result Date: 8/15/2022  EXAMINATION: CT ABDOMEN PELVIS WITH CONTRAST CLINICAL HISTORY: Abdominal abscess/infection suspected; TECHNIQUE: CT imaging of the abdomen and pelvis after the administration of intravenous contrast. Dose length product is 1190 mGycm. Automatic exposure control,  adjustment of mA/kV or iterative reconstruction technique used to limit radiation dose. COMPARISON: No relevant comparison studies available at the time of dictation. FINDINGS: Liver/biliary: Subcentimeter right hepatic hypodensity too small to fully evaluate, but statistically benign.  Few gallstones.  No biliary dilatation. Pancreas: Normal. Spleen: Upper normal in size. Adrenals: Normal. Genitourinary: 15 mm Bosniak 2 cyst in the left kidney.  This needs no dedicated imaging follow-up by consensus recommendation.  No hydronephrosis.  Bladder decompressed and not well evaluated.  Prostate size upper normal. Stomach/bowel: No evidence of bowel obstruction. Appendix not confidently seen.  No definitive sites of bowel inflammation. Lymph nodes: No pathologically enlarged lymph node identified. Peritoneum: No ascites or free air. No fluid collection. Vasculature: At least moderate calcifications in the abdominal aorta and iliac arteries. Abdominal wall: Very small fat containing left inguinal hernia. Lung bases: No consolidation or pleural effusion. Musculoskeletal: No acute osseous findings. Prior posterior decompression at L4 with L3-4 and L4-5 interbody implants.   Impression  1. No acute process identified.  2. Cholelithiasis.    US Abdomen Limited  Result Date: 8/15/2022  EXAMINATION: US ABDOMEN LIMITED CLINICAL HISTORY: abdominal pain; TECHNIQUE: Gray-scale and color Doppler ultrasound images of the abdomen. COMPARISON: Ultrasound 08/05/2021 FINDINGS: Pancreas partially obscured with no abnormality identified as imaged.  No significant findings regarding the imaged aorta and IVC. Liver has normal size with mildly increased overall hepatic echogenicity.  No focal liver lesion identified.  Main portal vein patent with normal flow direction. No biliary dilatation or ascites.  Few gallstones, measuring up to about 2 cm.  No gallbladder wall thickening.  Negative sonographic Nathan sign. No hydronephrosis or defined  calcification in the right kidney. Measurements: - Liver: 14.3 cm - CBD diameter: 3 mm - Right kidney: 11.5 cm in length   Impression:  1. Cholelithiasis.  No biliary dilatation or sonographic findings for cholecystitis.   2. Hepatic steatosis.

## 2022-08-19 NOTE — NURSING
Printed and went over discharge instructions with pt and spouse. Answered all questions and concerns. Discontinued IV per order with catheter still intact. Escorted pt to private vehicle with all personal belongings via wheelchair.

## 2022-08-19 NOTE — PLAN OF CARE
[facility] - Medical Surgical Unit  Discharge Final Note    Primary Care Provider: Chad Moran Jr, MD    Expected Discharge Date: 8/19/2022    Final Discharge Note (most recent)     Final Note - 08/19/22 1452        Final Note    Assessment Type Final Discharge Note     What phone number can be called within the next 1-3 days to see how you are doing after discharge? [phone]     Hospital Resources/Appts/Education Provided Provided patient/caregiver with written discharge plan information        Post-Acute Status    Discharge Delays None known at this time                 Important Message from Medicare             Contact Info     Chad Moran Jr, MD   Specialty: Family Medicine   Relationship: PCP - General    [address] [place] [id number]   Phone: [phone]       Next Steps: Schedule an appointment as soon as possible for a visit in 1 week(s)    Instructions: Note: discuss referral to general surgeon for possible cholecystectomy evaluation  Follow up scheduled for 8/26/22 at 10:00AM - Spoke to Valeriano

## 2022-08-19 NOTE — PLAN OF CARE
Problem: Adult Inpatient Plan of Care  Goal: Plan of Care Review  Outcome: Met  Flowsheets (Taken 8/19/2022 0924)  Plan of Care Reviewed With: patient  Goal: Patient-Specific Goal (Individualized)  Outcome: Met  Flowsheets (Taken 8/19/2022 0924)  Anxieties, Fears or Concerns: none  Individualized Care Needs: pain management  Patient-Specific Goals (Include Timeframe): to go home  Goal: Absence of Hospital-Acquired Illness or Injury  Outcome: Met  Intervention: Identify and Manage Fall Risk  Flowsheets (Taken 8/19/2022 0924)  Safety Promotion/Fall Prevention:   assistive device/personal item within reach   bed alarm set   nonskid shoes/socks when out of bed   family to remain at bedside  Intervention: Prevent Skin Injury  Flowsheets (Taken 8/19/2022 0924)  Body Position: sitting up in bed  Skin Protection:   adhesive use limited   incontinence pads utilized  Intervention: Prevent and Manage VTE (Venous Thromboembolism) Risk  Flowsheets (Taken 8/19/2022 0924)  Activity Management: Ambulated -L4  VTE Prevention/Management:   bleeding risk assessed   bleeding precations maintained  Range of Motion: active ROM (range of motion) encouraged  Intervention: Prevent Infection  Flowsheets (Taken 8/19/2022 0924)  Infection Prevention: hand hygiene promoted  Goal: Optimal Comfort and Wellbeing  Outcome: Met  Intervention: Monitor Pain and Promote Comfort  Flowsheets (Taken 8/19/2022 0924)  Pain Management Interventions: pillow support provided  Intervention: Provide Person-Centered Care  Flowsheets (Taken 8/19/2022 0924)  Trust Relationship/Rapport:   care explained   questions answered  Goal: Readiness for Transition of Care  Outcome: Met  Intervention: Mutually Develop Transition Plan  Flowsheets (Taken 8/19/2022 0924)  Equipment Currently Used at Home: none  Transportation Anticipated: family or friend will provide  Communicated XIOMARA with patient/caregiver: Yes  Do you expect to return to your current living situation?:  Yes  Do you have help at home or someone to help you manage your care at home?: Yes  Readmission within 30 days?: No  Do you currently have service(s) that help you manage your care at home?: No  Is the pt/caregiver preference to resume services with current agency: No     Problem: Pain Acute  Goal: Acceptable Pain Control and Functional Ability  Outcome: Met  Intervention: Develop Pain Management Plan  Flowsheets (Taken 8/19/2022 0924)  Pain Management Interventions: pillow support provided  Intervention: Prevent or Manage Pain  Flowsheets (Taken 8/19/2022 0927)  Sleep/Rest Enhancement: awakenings minimized  Sensory Stimulation Regulation:   care clustered   quiet environment promoted  Bowel Elimination Promotion: adequate fluid intake promoted  Medication Review/Management: medications reviewed  Intervention: Optimize Psychosocial Wellbeing  Flowsheets (Taken 8/19/2022 0927)  Supportive Measures: active listening utilized  Diversional Activities: television     Problem: Fall Injury Risk  Goal: Absence of Fall and Fall-Related Injury  Outcome: Met  Intervention: Identify and Manage Contributors  Flowsheets (Taken 8/19/2022 0927)  Self-Care Promotion: independence encouraged  Medication Review/Management: medications reviewed  Intervention: Promote Injury-Free Environment  Flowsheets (Taken 8/19/2022 0927)  Safety Promotion/Fall Prevention:   assistive device/personal item within reach   family to remain at bedside   nonskid shoes/socks when out of bed

## 2022-08-21 LAB — BACTERIA BLD CULT: NORMAL

## 2022-08-22 ENCOUNTER — PATIENT OUTREACH (OUTPATIENT)
Dept: ADMINISTRATIVE | Facility: CLINIC | Age: 72
End: 2022-08-22
Payer: MEDICARE

## 2022-08-22 LAB — BACTERIA BLD CULT: NORMAL

## 2022-08-22 NOTE — PROGRESS NOTES
C3 nurse attempted to contact Avery Hill Jr. for a TCC post hospital discharge follow up call. The patient is unable to conduct the call @ this time. The patient requested a callback.    The patient has a scheduled [facility] appointment with Chad Moran Jr, MD on 8/26/2022 @ 1000. Message sent to Physician staff.

## 2022-08-23 NOTE — PROGRESS NOTES
C3 nurse attempted to contact Avery Hill Jr. for a TCC post hospital discharge follow up call. No answer. No voicemail available. The patient has a scheduled HOSFU appointment with Chad Moran Jr, MD on 8/26/2022 @ [facility]

## 2022-08-30 DIAGNOSIS — K80.20 CALCULUS OF GALLBLADDER WITHOUT CHOLECYSTITIS WITHOUT OBSTRUCTION: Primary | ICD-10-CM

## 2022-09-29 NOTE — H&P (VIEW-ONLY)
History & Physical    CHIEF COMPLAINT:  CHOLELITHIASIS     History of Present Illness:  72 year-old-male referred by Dr. Moran who presented to the ER with complaints of abdominal pain, nausea and vomiting.  He was found to have biliary pancreatitis without evidence of necrosis, also with bacteremia suggestive of cholangitis.  He recovered with antibiotic management.  Continues to have occasional symptoms of right upper quadrant epigastric pain after eating.  He reports no further fevers or chills no symptoms of jaundice.  Abdominal ultrasound and CT scan both revealed cholelithiasis.      He underwent bypass surgery 19 years ago, followed by Dr. Ward, cleared him for cholecystectomy.  He takes full-strength aspirin daily no other antiplatelet or anticoagulation therapy.  He denies chest pain, dyspnea on exertion, nocturnal dyspnea or edema.      Review of patient's allergies indicates:  No Known Allergies    Current Outpatient Medications   Medication Sig Dispense Refill    aspirin 325 MG tablet Take 325 mg by mouth once daily.      atorvastatin (LIPITOR) 20 MG tablet Take 20 mg by mouth once daily.      bicalutamide (CASODEX) 50 MG Tab Take 50 mg by mouth Daily.      clomiPHENE (CLOMID) 50 mg tablet Take 0.5 tablets by mouth once daily.      furosemide (LASIX) 20 MG tablet Take 20 mg by mouth once daily.      HYDROcodone-acetaminophen (NORCO)  mg per tablet Take 1 tablet by mouth 3 (three) times daily.      lactulose (CHRONULAC) 10 gram/15 mL solution Take 10 g by mouth Daily.      losartan (COZAAR) 50 MG tablet Take 50 mg by mouth once daily.      megestroL (MEGACE) 20 MG Tab Take 20 mg by mouth Daily.      meloxicam (MOBIC) 15 MG tablet Take 15 mg by mouth once daily.      ondansetron (ZOFRAN) 8 MG tablet Take by mouth every 8 (eight) hours as needed for Nausea.      pantoprazole (PROTONIX) 40 MG tablet Take 40 mg by mouth once daily.      pramipexole (MIRAPEX) 0.5 MG tablet Take 0.25 mg by mouth once  daily.      tamsulosin (FLOMAX) 0.4 mg Cap Take 2 capsules by mouth once daily.      tiZANidine (ZANAFLEX) 4 MG tablet Take 4 mg by mouth 2 (two) times daily.       No current facility-administered medications for this visit.       Past Medical History:   Diagnosis Date    BPH (benign prostatic hyperplasia)     Cancer     COPD (chronic obstructive pulmonary disease)     Coronary artery disease     GERD (gastroesophageal reflux disease)     HLD (hyperlipidemia)     HTN (hypertension)     Lumbar disc disease     PAD (peripheral artery disease)     Prostate CA      Past Surgical History:   Procedure Laterality Date    APPENDECTOMY      BACK SURGERY      CARDIAC CATHETERIZATION      CARDIAC SURGERY      CORONARY ARTERY BYPASS GRAFT      HERNIA REPAIR      NASAL SEPTUM SURGERY       No family history on file.  Social History     Tobacco Use    Smoking status: Former    Smokeless tobacco: Never   Substance Use Topics    Alcohol use: Yes     Comment: weekends    Drug use: Never        Review of Systems:  Review of Systems   Constitutional:  Negative for activity change, appetite change, chills, diaphoresis, fatigue and fever.   HENT:  Negative for congestion, ear pain, tinnitus and trouble swallowing.    Eyes:  Negative for photophobia and pain.   Respiratory:  Negative for apnea, cough, choking, chest tightness, shortness of breath and stridor.    Cardiovascular:  Negative for chest pain, palpitations and leg swelling.   Endocrine: Negative for cold intolerance and heat intolerance.   Genitourinary:  Negative for difficulty urinating, dysuria, enuresis, flank pain, frequency and hematuria.   Musculoskeletal:  Negative for arthralgias, back pain and gait problem.   Neurological:  Negative for dizziness, seizures, syncope, facial asymmetry, speech difficulty, weakness, light-headedness, numbness and headaches.   Psychiatric/Behavioral:  Negative for agitation, behavioral problems, confusion and decreased concentration.     All other systems reviewed and are negative.    OBJECTIVE:     Vital Signs (Most Recent)              Physical Exam:  Physical Exam  Constitutional:       General: He is not in acute distress.     Appearance: Normal appearance. He is well-developed and normal weight. He is not ill-appearing, toxic-appearing or diaphoretic.   HENT:      Head: Normocephalic and atraumatic.      Right Ear: Hearing and external ear normal.      Left Ear: Hearing and external ear normal.      Nose: No congestion or rhinorrhea.      Mouth/Throat:      Mouth: Mucous membranes are moist.      Pharynx: Oropharynx is clear. No oropharyngeal exudate.   Eyes:      General: Lids are normal. Gaze aligned appropriately. No scleral icterus.     Extraocular Movements: Extraocular movements intact.      Right eye: Normal extraocular motion and no nystagmus.      Left eye: Normal extraocular motion and no nystagmus.      Conjunctiva/sclera: Conjunctivae normal.      Right eye: Right conjunctiva is not injected.      Left eye: Left conjunctiva is not injected.      Pupils: Pupils are equal, round, and reactive to light.   Neck:      Vascular: No carotid bruit or JVD.      Trachea: Trachea and phonation normal.   Cardiovascular:      Rate and Rhythm: Normal rate and regular rhythm.      Pulses: Normal pulses.      Heart sounds: Normal heart sounds. No murmur heard.    No friction rub. No gallop.   Pulmonary:      Effort: Pulmonary effort is normal. No tachypnea, accessory muscle usage, respiratory distress or retractions.      Breath sounds: Normal breath sounds and air entry. No stridor or decreased air movement. No wheezing or rhonchi.   Chest:      Chest wall: No mass, deformity, swelling, tenderness or crepitus.   Abdominal:      General: Abdomen is flat. Bowel sounds are normal. There is no distension. There are no signs of injury.      Palpations: Abdomen is soft. There is no shifting dullness, fluid wave, hepatomegaly, splenomegaly or mass.       Tenderness: There is no abdominal tenderness. There is no guarding or rebound.      Hernia: No hernia is present.   Musculoskeletal:         General: No swelling or tenderness.      Cervical back: Normal range of motion and neck supple. No rigidity, tenderness or crepitus.   Lymphadenopathy:      Head:      Right side of head: No submental, submandibular or occipital adenopathy.      Left side of head: No submental, submandibular or occipital adenopathy.      Cervical: No cervical adenopathy.      Right cervical: No superficial or posterior cervical adenopathy.     Left cervical: No superficial or posterior cervical adenopathy.      Upper Body:      Right upper body: No supraclavicular or axillary adenopathy.      Left upper body: No supraclavicular or axillary adenopathy.      Lower Body: No right inguinal adenopathy. No left inguinal adenopathy.   Skin:     General: Skin is warm.      Capillary Refill: Capillary refill takes less than 2 seconds.      Coloration: Skin is not cyanotic, jaundiced, mottled or pale.      Findings: No bruising, ecchymosis, erythema, lesion, petechiae or rash.      Nails: There is no clubbing.   Neurological:      General: No focal deficit present.      Mental Status: He is alert and oriented to person, place, and time.      Cranial Nerves: No cranial nerve deficit or facial asymmetry.      Sensory: No sensory deficit.      Motor: No weakness, tremor, atrophy or abnormal muscle tone.      Coordination: Coordination normal.      Gait: Gait normal.      Deep Tendon Reflexes: Reflexes normal.   Psychiatric:         Attention and Perception: Attention and perception normal.         Mood and Affect: Mood normal. Mood is not anxious or depressed. Affect is not blunt or flat.         Speech: Speech normal. Speech is not slurred.         Behavior: Behavior normal. Behavior is cooperative.         ASSESSMENT/PLAN:     Symptomatic cholelithiasis with recent bout of biliary pancreatitis without  necrosis    Recommend cholecystectomy, schedule laparoscopic cholecystectomy  Hold full-dose aspirin 1 week preoperatively, can take baby aspirin until surgery.    The risks and benefits of the procedure were explained in detail, questions were addressed, the patient gives consent to proceed      Joesph Maya MD  Surgical Oncology  Complex General, Gastrointestinal and Hepatobiliary Surgery

## 2022-10-04 ENCOUNTER — OFFICE VISIT (OUTPATIENT)
Dept: SURGICAL ONCOLOGY | Facility: CLINIC | Age: 72
End: 2022-10-04
Payer: MEDICARE

## 2022-10-04 VITALS
BODY MASS INDEX: 25.56 KG/M2 | WEIGHT: 168.63 LBS | HEART RATE: 67 BPM | DIASTOLIC BLOOD PRESSURE: 72 MMHG | HEIGHT: 68 IN | SYSTOLIC BLOOD PRESSURE: 118 MMHG

## 2022-10-04 DIAGNOSIS — K85.10 ACUTE BILIARY PANCREATITIS WITHOUT INFECTION OR NECROSIS: Primary | ICD-10-CM

## 2022-10-04 DIAGNOSIS — K80.20 CALCULUS OF GALLBLADDER WITHOUT CHOLECYSTITIS WITHOUT OBSTRUCTION: Primary | ICD-10-CM

## 2022-10-04 DIAGNOSIS — K80.20 CALCULUS OF GALLBLADDER WITHOUT CHOLECYSTITIS WITHOUT OBSTRUCTION: ICD-10-CM

## 2022-10-04 DIAGNOSIS — K82.9 GALLBLADDER DISEASE: ICD-10-CM

## 2022-10-04 PROCEDURE — 99203 OFFICE O/P NEW LOW 30 MIN: CPT | Mod: S$PBB,,, | Performed by: SURGERY

## 2022-10-04 PROCEDURE — 99214 OFFICE O/P EST MOD 30 MIN: CPT | Mod: PBBFAC | Performed by: SURGERY

## 2022-10-04 PROCEDURE — 99203 PR OFFICE/OUTPT VISIT, NEW, LEVL III, 30-44 MIN: ICD-10-PCS | Mod: S$PBB,,, | Performed by: SURGERY

## 2022-10-04 PROCEDURE — 99999 PR PBB SHADOW E&M-EST. PATIENT-LVL IV: ICD-10-PCS | Mod: PBBFAC,,, | Performed by: SURGERY

## 2022-10-04 PROCEDURE — 99999 PR PBB SHADOW E&M-EST. PATIENT-LVL IV: CPT | Mod: PBBFAC,,, | Performed by: SURGERY

## 2022-10-04 RX ORDER — ENOXAPARIN SODIUM 100 MG/ML
30 INJECTION SUBCUTANEOUS EVERY 24 HOURS
Status: CANCELLED | OUTPATIENT
Start: 2022-10-04

## 2022-10-25 ENCOUNTER — HOSPITAL ENCOUNTER (OUTPATIENT)
Dept: RADIOLOGY | Facility: HOSPITAL | Age: 72
Discharge: HOME | End: 2022-10-25
Attending: SURGERY
Payer: MEDICARE

## 2022-10-25 DIAGNOSIS — K80.20 CALCULUS OF GALLBLADDER WITHOUT CHOLECYSTITIS WITHOUT OBSTRUCTION: ICD-10-CM

## 2022-10-25 PROCEDURE — 71045 X-RAY EXAM CHEST 1 VIEW: CPT | Mod: TC

## 2022-10-26 RX ORDER — ASPIRIN 81 MG/1
81 TABLET ORAL DAILY
COMMUNITY

## 2022-10-26 RX ORDER — UMECLIDINIUM BROMIDE AND VILANTEROL TRIFENATATE 62.5; 25 UG/1; UG/1
1 POWDER RESPIRATORY (INHALATION) DAILY
COMMUNITY

## 2022-10-27 ENCOUNTER — ANESTHESIA EVENT (OUTPATIENT)
Dept: SURGERY | Facility: HOSPITAL | Age: 72
End: 2022-10-27
Payer: MEDICARE

## 2022-10-28 ENCOUNTER — ANESTHESIA (OUTPATIENT)
Dept: SURGERY | Facility: HOSPITAL | Age: 72
End: 2022-10-28
Payer: MEDICARE

## 2022-10-28 ENCOUNTER — HOSPITAL ENCOUNTER (OUTPATIENT)
Facility: HOSPITAL | Age: 72
Discharge: HOME | End: 2022-10-28
Attending: SURGERY | Admitting: SURGERY
Payer: MEDICARE

## 2022-10-28 DIAGNOSIS — K80.20 CALCULUS OF GALLBLADDER WITHOUT CHOLECYSTITIS WITHOUT OBSTRUCTION: ICD-10-CM

## 2022-10-28 DIAGNOSIS — K82.9 GALLBLADDER DISEASE: ICD-10-CM

## 2022-10-28 PROCEDURE — 36000708 HC OR TIME LEV III 1ST 15 MIN: Performed by: SURGERY

## 2022-10-28 PROCEDURE — 63600175 PHARM REV CODE 636 W HCPCS: Performed by: SURGERY

## 2022-10-28 PROCEDURE — 63600175 PHARM REV CODE 636 W HCPCS: Performed by: ANESTHESIOLOGY

## 2022-10-28 PROCEDURE — 25000003 PHARM REV CODE 250

## 2022-10-28 PROCEDURE — 63600175 PHARM REV CODE 636 W HCPCS

## 2022-10-28 PROCEDURE — 71000033 HC RECOVERY, INTIAL HOUR: Performed by: SURGERY

## 2022-10-28 PROCEDURE — 71000016 HC POSTOP RECOV ADDL HR: Performed by: SURGERY

## 2022-10-28 PROCEDURE — 37000009 HC ANESTHESIA EA ADD 15 MINS: Performed by: SURGERY

## 2022-10-28 PROCEDURE — 25000003 PHARM REV CODE 250: Performed by: SURGERY

## 2022-10-28 PROCEDURE — 88304 TISSUE EXAM BY PATHOLOGIST: CPT | Performed by: SURGERY

## 2022-10-28 PROCEDURE — 36000709 HC OR TIME LEV III EA ADD 15 MIN: Performed by: SURGERY

## 2022-10-28 PROCEDURE — 37000008 HC ANESTHESIA 1ST 15 MINUTES: Performed by: SURGERY

## 2022-10-28 PROCEDURE — 71000015 HC POSTOP RECOV 1ST HR: Performed by: SURGERY

## 2022-10-28 PROCEDURE — 27201423 OPTIME MED/SURG SUP & DEVICES STERILE SUPPLY: Performed by: SURGERY

## 2022-10-28 PROCEDURE — 63600175 PHARM REV CODE 636 W HCPCS: Performed by: STUDENT IN AN ORGANIZED HEALTH CARE EDUCATION/TRAINING PROGRAM

## 2022-10-28 RX ORDER — HYDROMORPHONE HYDROCHLORIDE 2 MG/ML
0.4 INJECTION, SOLUTION INTRAMUSCULAR; INTRAVENOUS; SUBCUTANEOUS EVERY 5 MIN PRN
Status: DISCONTINUED | OUTPATIENT
Start: 2022-10-28 | End: 2022-10-28 | Stop reason: HOSPADM

## 2022-10-28 RX ORDER — ONDANSETRON 2 MG/ML
4 INJECTION INTRAMUSCULAR; INTRAVENOUS DAILY PRN
Status: DISCONTINUED | OUTPATIENT
Start: 2022-10-28 | End: 2022-10-28 | Stop reason: HOSPADM

## 2022-10-28 RX ORDER — MEPERIDINE HYDROCHLORIDE 25 MG/ML
12.5 INJECTION INTRAMUSCULAR; INTRAVENOUS; SUBCUTANEOUS EVERY 10 MIN PRN
Status: DISCONTINUED | OUTPATIENT
Start: 2022-10-28 | End: 2022-10-28 | Stop reason: HOSPADM

## 2022-10-28 RX ORDER — ACETAMINOPHEN 10 MG/ML
INJECTION, SOLUTION INTRAVENOUS
Status: DISCONTINUED | OUTPATIENT
Start: 2022-10-28 | End: 2022-10-28

## 2022-10-28 RX ORDER — SODIUM CHLORIDE, SODIUM LACTATE, POTASSIUM CHLORIDE, CALCIUM CHLORIDE 600; 310; 30; 20 MG/100ML; MG/100ML; MG/100ML; MG/100ML
INJECTION, SOLUTION INTRAVENOUS CONTINUOUS
Status: DISCONTINUED | OUTPATIENT
Start: 2022-10-28 | End: 2022-10-28 | Stop reason: HOSPADM

## 2022-10-28 RX ORDER — BUPIVACAINE HYDROCHLORIDE 7.5 MG/ML
INJECTION, SOLUTION EPIDURAL; RETROBULBAR
Status: DISCONTINUED
Start: 2022-10-28 | End: 2022-10-28 | Stop reason: HOSPADM

## 2022-10-28 RX ORDER — SODIUM CHLORIDE, SODIUM GLUCONATE, SODIUM ACETATE, POTASSIUM CHLORIDE AND MAGNESIUM CHLORIDE 30; 37; 368; 526; 502 MG/100ML; MG/100ML; MG/100ML; MG/100ML; MG/100ML
INJECTION, SOLUTION INTRAVENOUS CONTINUOUS
Status: DISCONTINUED | OUTPATIENT
Start: 2022-10-28 | End: 2022-10-28 | Stop reason: HOSPADM

## 2022-10-28 RX ORDER — ROCURONIUM BROMIDE 10 MG/ML
INJECTION, SOLUTION INTRAVENOUS
Status: DISCONTINUED | OUTPATIENT
Start: 2022-10-28 | End: 2022-10-28

## 2022-10-28 RX ORDER — PROPOFOL 10 MG/ML
VIAL (ML) INTRAVENOUS
Status: DISCONTINUED | OUTPATIENT
Start: 2022-10-28 | End: 2022-10-28

## 2022-10-28 RX ORDER — BUPIVACAINE HYDROCHLORIDE 7.5 MG/ML
INJECTION, SOLUTION EPIDURAL; RETROBULBAR
Status: DISCONTINUED | OUTPATIENT
Start: 2022-10-28 | End: 2022-10-28 | Stop reason: HOSPADM

## 2022-10-28 RX ORDER — ENOXAPARIN SODIUM 100 MG/ML
INJECTION SUBCUTANEOUS
Status: COMPLETED
Start: 2022-10-28 | End: 2022-10-28

## 2022-10-28 RX ORDER — CEFAZOLIN SODIUM 2 G/50ML
2 SOLUTION INTRAVENOUS
Status: COMPLETED | OUTPATIENT
Start: 2022-10-28 | End: 2022-10-28

## 2022-10-28 RX ORDER — HYDROMORPHONE HYDROCHLORIDE 2 MG/ML
0.2 INJECTION, SOLUTION INTRAMUSCULAR; INTRAVENOUS; SUBCUTANEOUS EVERY 5 MIN PRN
Status: DISCONTINUED | OUTPATIENT
Start: 2022-10-28 | End: 2022-10-28 | Stop reason: HOSPADM

## 2022-10-28 RX ORDER — FENTANYL CITRATE 50 UG/ML
INJECTION, SOLUTION INTRAMUSCULAR; INTRAVENOUS
Status: DISCONTINUED | OUTPATIENT
Start: 2022-10-28 | End: 2022-10-28

## 2022-10-28 RX ORDER — CEFAZOLIN 2 G/1
INJECTION, POWDER, FOR SOLUTION INTRAMUSCULAR; INTRAVENOUS
Status: DISCONTINUED
Start: 2022-10-28 | End: 2022-10-28 | Stop reason: HOSPADM

## 2022-10-28 RX ORDER — LIDOCAINE HYDROCHLORIDE 10 MG/ML
1 INJECTION, SOLUTION EPIDURAL; INFILTRATION; INTRACAUDAL; PERINEURAL ONCE
Status: DISCONTINUED | OUTPATIENT
Start: 2022-10-28 | End: 2022-10-28 | Stop reason: HOSPADM

## 2022-10-28 RX ORDER — MIDAZOLAM HYDROCHLORIDE 1 MG/ML
INJECTION INTRAMUSCULAR; INTRAVENOUS
Status: COMPLETED
Start: 2022-10-28 | End: 2022-10-28

## 2022-10-28 RX ORDER — ENOXAPARIN SODIUM 100 MG/ML
30 INJECTION SUBCUTANEOUS EVERY 24 HOURS
Status: DISCONTINUED | OUTPATIENT
Start: 2022-10-28 | End: 2022-10-28 | Stop reason: HOSPADM

## 2022-10-28 RX ORDER — ONDANSETRON HYDROCHLORIDE 2 MG/ML
INJECTION, SOLUTION INTRAMUSCULAR; INTRAVENOUS
Status: DISCONTINUED | OUTPATIENT
Start: 2022-10-28 | End: 2022-10-28

## 2022-10-28 RX ORDER — ONDANSETRON 4 MG/1
8 TABLET, ORALLY DISINTEGRATING ORAL EVERY 6 HOURS PRN
Status: DISCONTINUED | OUTPATIENT
Start: 2022-10-28 | End: 2022-10-28 | Stop reason: HOSPADM

## 2022-10-28 RX ORDER — MIDAZOLAM HYDROCHLORIDE 1 MG/ML
2 INJECTION INTRAMUSCULAR; INTRAVENOUS ONCE AS NEEDED
Status: COMPLETED | OUTPATIENT
Start: 2022-10-28 | End: 2022-10-28

## 2022-10-28 RX ORDER — PHENYLEPHRINE HYDROCHLORIDE 10 MG/ML
INJECTION INTRAVENOUS
Status: DISCONTINUED | OUTPATIENT
Start: 2022-10-28 | End: 2022-10-28

## 2022-10-28 RX ORDER — IPRATROPIUM BROMIDE AND ALBUTEROL SULFATE 2.5; .5 MG/3ML; MG/3ML
3 SOLUTION RESPIRATORY (INHALATION)
Status: DISCONTINUED | OUTPATIENT
Start: 2022-10-28 | End: 2022-10-28 | Stop reason: HOSPADM

## 2022-10-28 RX ORDER — DEXAMETHASONE SODIUM PHOSPHATE 4 MG/ML
INJECTION, SOLUTION INTRA-ARTICULAR; INTRALESIONAL; INTRAMUSCULAR; INTRAVENOUS; SOFT TISSUE
Status: DISCONTINUED | OUTPATIENT
Start: 2022-10-28 | End: 2022-10-28

## 2022-10-28 RX ORDER — PROCHLORPERAZINE EDISYLATE 5 MG/ML
5 INJECTION INTRAMUSCULAR; INTRAVENOUS EVERY 30 MIN PRN
Status: DISCONTINUED | OUTPATIENT
Start: 2022-10-28 | End: 2022-10-28 | Stop reason: HOSPADM

## 2022-10-28 RX ORDER — HYDROCODONE BITARTRATE AND ACETAMINOPHEN 5; 325 MG/1; MG/1
1 TABLET ORAL EVERY 6 HOURS PRN
Qty: 10 TABLET | Refills: 0 | Status: SHIPPED | OUTPATIENT
Start: 2022-10-28

## 2022-10-28 RX ADMIN — PHENYLEPHRINE HYDROCHLORIDE 100 MCG: 10 INJECTION INTRAVENOUS at 07:10

## 2022-10-28 RX ADMIN — CEFAZOLIN SODIUM 2 G: 2 SOLUTION INTRAVENOUS at 07:10

## 2022-10-28 RX ADMIN — MIDAZOLAM HYDROCHLORIDE 2 MG: 1 INJECTION INTRAMUSCULAR; INTRAVENOUS at 07:10

## 2022-10-28 RX ADMIN — ONDANSETRON 4 MG: 2 INJECTION INTRAMUSCULAR; INTRAVENOUS at 07:10

## 2022-10-28 RX ADMIN — HYDROMORPHONE HYDROCHLORIDE 0.4 MG: 2 INJECTION INTRAMUSCULAR; INTRAVENOUS; SUBCUTANEOUS at 08:10

## 2022-10-28 RX ADMIN — ENOXAPARIN SODIUM 30 MG: 30 INJECTION SUBCUTANEOUS at 05:10

## 2022-10-28 RX ADMIN — FENTANYL CITRATE 50 MCG: 50 INJECTION, SOLUTION INTRAMUSCULAR; INTRAVENOUS at 07:10

## 2022-10-28 RX ADMIN — SUGAMMADEX 200 MG: 100 INJECTION, SOLUTION INTRAVENOUS at 07:10

## 2022-10-28 RX ADMIN — FENTANYL CITRATE 50 MCG: 50 INJECTION, SOLUTION INTRAMUSCULAR; INTRAVENOUS at 08:10

## 2022-10-28 RX ADMIN — SODIUM CHLORIDE, POTASSIUM CHLORIDE, SODIUM LACTATE AND CALCIUM CHLORIDE: 600; 310; 30; 20 INJECTION, SOLUTION INTRAVENOUS at 08:10

## 2022-10-28 RX ADMIN — MIDAZOLAM 2 MG: 1 INJECTION INTRAMUSCULAR; INTRAVENOUS at 07:10

## 2022-10-28 RX ADMIN — SODIUM CHLORIDE, POTASSIUM CHLORIDE, SODIUM LACTATE AND CALCIUM CHLORIDE: 600; 310; 30; 20 INJECTION, SOLUTION INTRAVENOUS at 05:10

## 2022-10-28 RX ADMIN — DEXAMETHASONE SODIUM PHOSPHATE 8 MG: 4 INJECTION, SOLUTION INTRA-ARTICULAR; INTRALESIONAL; INTRAMUSCULAR; INTRAVENOUS; SOFT TISSUE at 07:10

## 2022-10-28 RX ADMIN — PHENYLEPHRINE HYDROCHLORIDE 100 MCG: 10 INJECTION INTRAVENOUS at 08:10

## 2022-10-28 RX ADMIN — ENOXAPARIN SODIUM 30 MG: 100 INJECTION SUBCUTANEOUS at 05:10

## 2022-10-28 RX ADMIN — ROCURONIUM BROMIDE 50 MG: 50 INJECTION INTRAVENOUS at 07:10

## 2022-10-28 RX ADMIN — ACETAMINOPHEN 1000 MG: 10 INJECTION, SOLUTION INTRAVENOUS at 07:10

## 2022-10-28 RX ADMIN — PROPOFOL 150 MG: 10 INJECTION, EMULSION INTRAVENOUS at 07:10

## 2022-10-28 NOTE — DISCHARGE SUMMARY
[facility] Surgical - Periop Services  Discharge Note  Short Stay    Procedure(s) (LRB):  CHOLECYSTECTOMY, LAPAROSCOPIC (N/A)      OUTCOME: Patient tolerated treatment/procedure well without complication and is now ready for discharge.    DISPOSITION: Home or Self Care    FINAL DIAGNOSIS:  <principal problem not specified>    FOLLOWUP: In clinic    DISCHARGE INSTRUCTIONS:  No discharge procedures on file.      Clinical Reference Documents Added to Patient Instructions         Document    CHOLECYSTECTOMY DISCHARGE INSTRUCTIONS (ENGLISH)    LOW CHOLESTEROL, SATURATED FAT, AND TRANS FAT DIET  (ENGLISH)            TIME SPENT ON DISCHARGE:    minutes

## 2022-10-28 NOTE — ANESTHESIA PROCEDURE NOTES
Intubation    Date/Time: 10/28/2022 7:12 AM  Performed by: Tiffanie Recio CRNA  Authorized by: Yaniv Bravo MD     Intubation:     Induction:  Intravenous    Intubated:  Postinduction    Mask Ventilation:  Easy with oral airway    Attempts:  1    Attempted By:  CRNA    Method of Intubation:  Direct    Blade:  Romero 2    Laryngeal View Grade: Grade I - full view of cords      Difficult Airway Encountered?: No      Complications:  None    Airway Device:  Oral endotracheal tube    Airway Device Size:  7.5    Style/Cuff Inflation:  Cuffed (inflated to minimal occlusive pressure)    Inflation Amount (mL):  6    Tube secured:  22    Secured at:  The lips    Placement Verified By:  Capnometry    Complicating Factors:  None    Findings Post-Intubation:  BS equal bilateral

## 2022-10-28 NOTE — ANESTHESIA POSTPROCEDURE EVALUATION
Anesthesia Post Evaluation    Patient: Avery Hill Jr.    Procedure(s) Performed: Procedure(s) (LRB):  CHOLECYSTECTOMY, LAPAROSCOPIC (N/A)    Final Anesthesia Type: general      Patient location during evaluation: PACU  Patient participation: Yes- Able to Participate  Level of consciousness: awake and alert  Post-procedure vital signs: reviewed and stable  Pain management: adequate  Airway patency: patent    PONV status at discharge: No PONV  Anesthetic complications: no      Respiratory status: unassisted  Hydration status: euvolemic  Follow-up not needed.          Vitals Value Taken Time   /75 10/28/22 0900   Temp 36.4 °C (97.5 °F) 10/28/22 0900   Pulse 74 10/28/22 0900   Resp 18 10/28/22 0900   SpO2 95 % 10/28/22 0900         Event Time   Out of Recovery 09:00:00         Pain/Deo Score: Pain Rating Prior to Med Admin: 6 (10/28/2022  8:45 AM)  Deo Score: 10 (10/28/2022  9:05 AM)

## 2022-10-28 NOTE — TRANSFER OF CARE
"Anesthesia Transfer of Care Note    Patient: Avery Hill Jr.    Procedure(s) Performed: Procedure(s) (LRB):  CHOLECYSTECTOMY, LAPAROSCOPIC (N/A)    Patient location: PACU    Anesthesia Type: general    Transport from OR: Transported from OR on room air with adequate spontaneous ventilation    Post pain: adequate analgesia    Post assessment: no apparent anesthetic complications and tolerated procedure well    Post vital signs: stable    Level of consciousness: responds to stimulation    Nausea/Vomiting: no nausea/vomiting    Complications: none    Transfer of care protocol was followed      Last vitals:   Visit Vitals  /75 (BP Location: Right arm, Patient Position: Lying)   Pulse 90   Temp 36.4 °C (97.5 °F) (Tympanic)   Resp 14   Ht 5' 8" (1.727 m)   Wt 76.7 kg (169 lb 1.5 oz)   SpO2 96%   BMI 25.71 kg/m²     "

## 2022-10-28 NOTE — OP NOTE
Date of Surgery: 10/28/22    Surgeon:  Joesph Maya MD    Assistant:  None                                         Pre-op Diagnosis:  Cholelithiasis    Post-op Diagnosis:  Same    Procedure:  Laparoscopic cholecystectomy    Anesthesia:  GETA    EBL:  Less than 25 cc    Specimens:  Gallbladder    Findings:  Chronic inflammation and distention of the gallbladder    Procedure in detail: After informed consent was obtained the patient was brought to the operating room and placed in the supine position.  General endotracheal anesthesia was administered without difficulty.  The patient's abdomen was prepped and draped in sterile fashion.  After infiltration with local anesthesia, a small incision was made above the umbilicus and a 5 mm optical trocar was used to enter the peritoneal cavity under direct vision.  Pneumoperitoneum was achieved without difficulty.  Additional ports were placed in the epigastrium and right upper quadrant under direct vision.  The patient was placed in steep reverse Trendelenburg position, and tilted towards the left.  The gallbladder was visualized in the right upper quadrant, and demonstrated signs of chronic inflammation and distention.  The fundus was retracted cephalad, the infundibulum was retracted laterally.  The cystic duct was identified and dissected circumferentially using gentle blunt dissection at its confluence with the gallbladder.  The cystic artery was similarly dissected at its entrance into the gallbladder.  Once the anatomy was clearly defined, and the critical view of safety was obtained, the cystic duct was divided between clips with 2 clips placed on the staying inside.  The cystic artery was divided between clips.  The gallbladder was excised from gallbladder fossa using hook electrocautery.  Meticulous hemostasis was achieved.  The gallbladder is decompressed, placed in an Endo Catch bag and removed.  The right upper quadrant was irrigated and inspected for  hemostasis which appeared to be excellent.  Ports were removed, pneumoperitoneum was relieved, port sites were closed with absorbable suture and sterile dressings were applied.  The patient tolerated the procedure well.  They were brought to recovery room in stable condition

## 2022-10-28 NOTE — ANESTHESIA PREPROCEDURE EVALUATION
10/27/2022  Avery Hill Jr. is a 72 y.o., male presents as an outpatient for perez quintero.  Patient has a history of coronary disease status post CABG (2002) with EF of 60% mild-to-moderate valvular heart disease.  Cleared by cardiology is a mod risk    2D echo August 2022   EF 60%   One to 2+ AI, 1+ MR/TR       Cardiac PET scan June 2020   Negative for ischemia   EF 63-71%    Last 3 sets of Vitals    Vitals - 1 value per visit 10/4/2022 10/26/2022 10/28/2022   SYSTOLIC 118 - 157   DIASTOLIC 72 - 81   Pulse 67 - 65   Temp - - 98.2   Resp - - 18   SPO2 - - 99   Weight (lb) 168.6 168 169.09   Weight (kg) 76.476 76.204 76.7   Height 68 68 -   BMI (Calculated) 25.6 25.6 25.7   VISIT REPORT - - -         Lab Results   Component Value Date    WBC 5.6 10/25/2022    HGB 12.8 (L) 10/25/2022    HCT 38.7 (L) 10/25/2022    MCV 93.9 10/25/2022     10/25/2022          BMP  Lab Results   Component Value Date     10/25/2022    K 4.8 10/25/2022    CO2 20 (L) 10/25/2022    BUN 22.4 10/25/2022    CREATININE 1.28 (H) 10/25/2022    CALCIUM 9.5 10/25/2022    EGFRNONAA >60 06/17/2022        CMP  Sodium Level   Date Value Ref Range Status   10/25/2022 138 136 - 145 mmol/L Final     Potassium Level   Date Value Ref Range Status   10/25/2022 4.8 3.5 - 5.1 mmol/L Final     Carbon Dioxide   Date Value Ref Range Status   10/25/2022 20 (L) 23 - 31 mmol/L Final     Blood Urea Nitrogen   Date Value Ref Range Status   10/25/2022 22.4 8.4 - 25.7 mg/dL Final     Creatinine   Date Value Ref Range Status   10/25/2022 1.28 (H) 0.73 - 1.18 mg/dL Final     Calcium Level Total   Date Value Ref Range Status   10/25/2022 9.5 8.8 - 10.0 mg/dL Final     Albumin Level   Date Value Ref Range Status   10/25/2022 4.2 3.4 - 4.8 gm/dL Final     Bilirubin Total   Date Value Ref Range Status   10/25/2022 0.5 <=1.5 mg/dL Final     Alkaline  Phosphatase   Date Value Ref Range Status   10/25/2022 69 40 - 150 unit/L Final     Aspartate Aminotransferase   Date Value Ref Range Status   10/25/2022 14 5 - 34 unit/L Final     Alanine Aminotransferase   Date Value Ref Range Status   10/25/2022 18 0 - 55 unit/L Final     Estimated GFR-Non    Date Value Ref Range Status   06/17/2022 >60 mls/min/1.73/m2 Final        Lab Results   Component Value Date    AMYLASE 191 (H) 08/15/2022       Lab Results   Component Value Date    LIPASE 237 (H) 08/15/2022        Lab Results   Component Value Date    HGBA1C 5.5 06/20/2022           Pre-op Assessment    I have reviewed the Patient Summary Reports.    I have reviewed the NPO Status.   I have reviewed the Medications.     Review of Systems  Anesthesia Hx:   Denies Personal Hx of Anesthesia complications.   Social:  Non-Smoker    Cardiovascular:   Hypertension  Functional Capacity good / => 4 METS  Coronary Artery Disease: S/P Aorto-Coronary Bypass Graft Surgery (CABG):    Pulmonary:   COPD, mild    Hepatic/GI:   GERD, well controlled        Physical Exam  General: Well nourished, Cooperative, Alert and Oriented    Airway:  Mouth Opening: Normal  TM Distance: Normal  Tongue: Normal  Neck ROM: Normal ROM    Dental:  Intact, Dentures  Upper dentures  Chest/Lungs:  Clear to auscultation, Normal Respiratory Rate    Heart:  Rate: Normal  Rhythm: Regular Rhythm        Anesthesia Plan  Type of Anesthesia, risks & benefits discussed:    Anesthesia Type: Gen ETT  Intra-op Monitoring Plan: Standard ASA Monitors  Post Op Pain Control Plan: multimodal analgesia and IV/PO Opioids PRN  Induction:  IV  Airway Plan: Direct  Informed Consent: Informed consent signed with the Patient and all parties understand the risks and agree with anesthesia plan.  All questions answered.   ASA Score: 3  Day of Surgery Review of History & Physical: H&P Update referred to the surgeon/provider.    Ready For Surgery From Anesthesia  Perspective.     .

## 2022-10-29 VITALS
HEART RATE: 67 BPM | TEMPERATURE: 98 F | WEIGHT: 169.06 LBS | SYSTOLIC BLOOD PRESSURE: 147 MMHG | OXYGEN SATURATION: 96 % | RESPIRATION RATE: 18 BRPM | BODY MASS INDEX: 25.62 KG/M2 | HEIGHT: 68 IN | DIASTOLIC BLOOD PRESSURE: 63 MMHG

## 2022-11-01 LAB
ESTROGEN SERPL-MCNC: NORMAL PG/ML
INSULIN SERPL-ACNC: NORMAL U[IU]/ML
LAB AP CLINICAL INFORMATION: NORMAL
LAB AP GROSS DESCRIPTION: NORMAL
LAB AP REPORT FOOTNOTES: NORMAL
T3RU NFR SERPL: NORMAL %

## 2022-11-08 ENCOUNTER — OFFICE VISIT (OUTPATIENT)
Dept: SURGICAL ONCOLOGY | Facility: CLINIC | Age: 72
End: 2022-11-08
Payer: MEDICARE

## 2022-11-08 DIAGNOSIS — K80.81 BILIARY CALCULUS OF OTHER SITE WITH OBSTRUCTION: Primary | ICD-10-CM

## 2022-11-08 PROCEDURE — 99024 POSTOP FOLLOW-UP VISIT: CPT | Mod: POP,,, | Performed by: NURSE PRACTITIONER

## 2022-11-08 PROCEDURE — 99024 PR POST-OP FOLLOW-UP VISIT: ICD-10-PCS | Mod: POP,,, | Performed by: NURSE PRACTITIONER

## 2022-11-08 NOTE — PROGRESS NOTES
POST OP LAP JULIUS    PT DOING VERY WELL  TELLS ME HE FEELS MUCH BETTER  EATING  HAVING BMS  NO FEVER OR CHILLS    ABD SOFT  INCISIONS HEALING WELL  NO SIGNS OF INFECTION    PATH: ACUTE CHOLECYSTITIS  PATH DISCUSSED AND QUESTIONS ANSWERED    HE IS VERY APPRECIATIVE  WILL CALL WITH ANY PROBLEMS    RTC PRN

## 2023-07-18 ENCOUNTER — LAB VISIT (OUTPATIENT)
Dept: LAB | Facility: HOSPITAL | Age: 73
End: 2023-07-18
Attending: FAMILY MEDICINE
Payer: MEDICARE

## 2023-07-18 DIAGNOSIS — I25.10 CORONARY ATHEROSCLEROSIS OF NATIVE CORONARY ARTERY: ICD-10-CM

## 2023-07-18 DIAGNOSIS — Z00.00 ROUTINE GENERAL MEDICAL EXAMINATION AT A HEALTH CARE FACILITY: ICD-10-CM

## 2023-07-18 DIAGNOSIS — E78.5 HYPERLIPIDEMIA, UNSPECIFIED HYPERLIPIDEMIA TYPE: ICD-10-CM

## 2023-07-18 DIAGNOSIS — Z12.5 SPECIAL SCREENING FOR MALIGNANT NEOPLASM OF PROSTATE: ICD-10-CM

## 2023-07-18 DIAGNOSIS — I10 ESSENTIAL HYPERTENSION, MALIGNANT: Primary | ICD-10-CM

## 2023-07-18 DIAGNOSIS — K21.9 GASTROESOPHAGEAL REFLUX DISEASE, UNSPECIFIED WHETHER ESOPHAGITIS PRESENT: ICD-10-CM

## 2023-07-18 LAB
ALBUMIN SERPL-MCNC: 4.2 G/DL (ref 3.4–4.8)
ALP SERPL-CCNC: 84 UNIT/L (ref 40–150)
ALT SERPL-CCNC: 21 UNIT/L (ref 0–55)
ANION GAP SERPL CALC-SCNC: 6 MEQ/L
AST SERPL-CCNC: 18 UNIT/L (ref 5–34)
BASOPHILS # BLD AUTO: 0.03 X10(3)/MCL
BASOPHILS NFR BLD AUTO: 0.6 %
BILIRUBIN DIRECT+TOT PNL SERPL-MCNC: 0.2 MG/DL (ref 0–0.8)
BILIRUBIN DIRECT+TOT PNL SERPL-MCNC: 0.2 MG/DL (ref 0–?)
BILIRUBIN DIRECT+TOT PNL SERPL-MCNC: 0.4 MG/DL
BUN SERPL-MCNC: 18.4 MG/DL (ref 8.4–25.7)
CALCIUM SERPL-MCNC: 9.7 MG/DL (ref 8.8–10)
CHLORIDE SERPL-SCNC: 110 MMOL/L (ref 98–107)
CHOLEST SERPL-MCNC: 194 MG/DL
CHOLEST/HDLC SERPL: 6 {RATIO} (ref 0–5)
CO2 SERPL-SCNC: 27 MMOL/L (ref 23–31)
CREAT SERPL-MCNC: 1 MG/DL (ref 0.73–1.18)
CREAT/UREA NIT SERPL: 18
EOSINOPHIL # BLD AUTO: 0.18 X10(3)/MCL (ref 0–0.9)
EOSINOPHIL NFR BLD AUTO: 3.9 %
ERYTHROCYTE [DISTWIDTH] IN BLOOD BY AUTOMATED COUNT: 13.1 % (ref 11.5–17)
FT4I SERPL CALC-MCNC: 2.19 (ref 2.6–3.6)
GFR SERPLBLD CREATININE-BSD FMLA CKD-EPI: >60 MLS/MIN/1.73/M2
GLUCOSE SERPL-MCNC: 115 MG/DL (ref 82–115)
HCT VFR BLD AUTO: 43.5 % (ref 42–52)
HDLC SERPL-MCNC: 34 MG/DL (ref 35–60)
HGB BLD-MCNC: 14 G/DL (ref 14–18)
IMM GRANULOCYTES # BLD AUTO: 0.01 X10(3)/MCL (ref 0–0.04)
IMM GRANULOCYTES NFR BLD AUTO: 0.2 %
LDLC SERPL CALC-MCNC: 107 MG/DL (ref 50–140)
LYMPHOCYTES # BLD AUTO: 1.22 X10(3)/MCL (ref 0.6–4.6)
LYMPHOCYTES NFR BLD AUTO: 26.4 %
MCH RBC QN AUTO: 30.2 PG (ref 27–31)
MCHC RBC AUTO-ENTMCNC: 32.2 G/DL (ref 33–36)
MCV RBC AUTO: 93.8 FL (ref 80–94)
MONOCYTES # BLD AUTO: 0.41 X10(3)/MCL (ref 0.1–1.3)
MONOCYTES NFR BLD AUTO: 8.9 %
NEUTROPHILS # BLD AUTO: 2.77 X10(3)/MCL (ref 2.1–9.2)
NEUTROPHILS NFR BLD AUTO: 60 %
PLATELET # BLD AUTO: 248 X10(3)/MCL (ref 130–400)
PMV BLD AUTO: 10 FL (ref 7.4–10.4)
POTASSIUM SERPL-SCNC: 4.4 MMOL/L (ref 3.5–5.1)
PROT SERPL-MCNC: 7.4 GM/DL (ref 5.8–7.6)
PSA SERPL-MCNC: <0.02 NG/ML
RBC # BLD AUTO: 4.64 X10(6)/MCL (ref 4.7–6.1)
SODIUM SERPL-SCNC: 143 MMOL/L (ref 136–145)
T3RU NFR SERPL: 38.04 % (ref 31–39)
T4 SERPL-MCNC: 5.75 UG/DL (ref 4.87–11.72)
TRIGL SERPL-MCNC: 265 MG/DL (ref 34–140)
TSH SERPL-ACNC: 2.75 UIU/ML (ref 0.35–4.94)
VLDLC SERPL CALC-MCNC: 53 MG/DL
WBC # SPEC AUTO: 4.62 X10(3)/MCL (ref 4.5–11.5)

## 2023-07-18 PROCEDURE — 85025 COMPLETE CBC W/AUTO DIFF WBC: CPT

## 2023-07-18 PROCEDURE — 84443 ASSAY THYROID STIM HORMONE: CPT

## 2023-07-18 PROCEDURE — 84436 ASSAY OF TOTAL THYROXINE: CPT

## 2023-07-18 PROCEDURE — 80061 LIPID PANEL: CPT

## 2023-07-18 PROCEDURE — 84153 ASSAY OF PSA TOTAL: CPT

## 2023-07-18 PROCEDURE — 80076 HEPATIC FUNCTION PANEL: CPT

## 2023-07-18 PROCEDURE — 36415 COLL VENOUS BLD VENIPUNCTURE: CPT

## 2023-07-18 PROCEDURE — 80048 BASIC METABOLIC PNL TOTAL CA: CPT

## 2024-09-12 ENCOUNTER — LAB VISIT (OUTPATIENT)
Dept: LAB | Facility: HOSPITAL | Age: 74
End: 2024-09-12
Attending: FAMILY MEDICINE
Payer: MEDICARE

## 2024-09-12 DIAGNOSIS — Z12.5 SPECIAL SCREENING FOR MALIGNANT NEOPLASM OF PROSTATE: ICD-10-CM

## 2024-09-12 DIAGNOSIS — I10 ESSENTIAL HYPERTENSION, MALIGNANT: ICD-10-CM

## 2024-09-12 DIAGNOSIS — J44.9 VANISHING LUNG: ICD-10-CM

## 2024-09-12 DIAGNOSIS — Z00.00 ROUTINE GENERAL MEDICAL EXAMINATION AT A HEALTH CARE FACILITY: Primary | ICD-10-CM

## 2024-09-12 DIAGNOSIS — I25.10 CORONARY ATHEROSCLEROSIS OF NATIVE CORONARY ARTERY: ICD-10-CM

## 2024-09-12 DIAGNOSIS — Z95.1 POSTSURGICAL AORTOCORONARY BYPASS STATUS: ICD-10-CM

## 2024-09-12 DIAGNOSIS — Z87.890 STATUS POST SEX REASSIGNMENT SURGERY: ICD-10-CM

## 2024-09-12 DIAGNOSIS — M51.9 INTERVERTEBRAL DISC DISORDER: ICD-10-CM

## 2024-09-12 DIAGNOSIS — I77.9 DISEASE OF ARTERY: ICD-10-CM

## 2024-09-12 DIAGNOSIS — K21.9 GASTROESOPHAGEAL REFLUX DISEASE, UNSPECIFIED WHETHER ESOPHAGITIS PRESENT: ICD-10-CM

## 2024-09-12 DIAGNOSIS — E78.5 HYPERLIPIDEMIA, UNSPECIFIED HYPERLIPIDEMIA TYPE: ICD-10-CM

## 2024-09-12 DIAGNOSIS — C61 MALIGNANT NEOPLASM OF PROSTATE: ICD-10-CM

## 2024-09-12 DIAGNOSIS — I73.9 PERIPHERAL VASCULAR DISEASE, UNSPECIFIED: ICD-10-CM

## 2024-09-12 LAB
ALBUMIN SERPL-MCNC: 4.3 G/DL (ref 3.4–4.8)
ALP SERPL-CCNC: 39 UNIT/L (ref 40–150)
ALT SERPL-CCNC: 15 UNIT/L (ref 0–55)
ANION GAP SERPL CALC-SCNC: 8 MEQ/L
AST SERPL-CCNC: 14 UNIT/L (ref 5–34)
BILIRUB DIRECT SERPL-MCNC: 0.2 MG/DL (ref 0–?)
BILIRUB SERPL-MCNC: 0.5 MG/DL
BILIRUBIN DIRECT+TOT PNL SERPL-MCNC: 0.3 MG/DL (ref 0–0.8)
BUN SERPL-MCNC: 17 MG/DL (ref 8.4–25.7)
CALCIUM SERPL-MCNC: 10 MG/DL (ref 8.8–10)
CHLORIDE SERPL-SCNC: 111 MMOL/L (ref 98–107)
CHOLEST SERPL-MCNC: 149 MG/DL
CHOLEST/HDLC SERPL: 5 {RATIO} (ref 0–5)
CO2 SERPL-SCNC: 24 MMOL/L (ref 23–31)
CREAT SERPL-MCNC: 1.37 MG/DL (ref 0.73–1.18)
CREAT/UREA NIT SERPL: 12
ERYTHROCYTE [DISTWIDTH] IN BLOOD BY AUTOMATED COUNT: 13.4 % (ref 11.5–17)
GFR SERPLBLD CREATININE-BSD FMLA CKD-EPI: 54 ML/MIN/1.73/M2
GLUCOSE SERPL-MCNC: 116 MG/DL (ref 82–115)
HCT VFR BLD AUTO: 39.3 % (ref 42–52)
HDLC SERPL-MCNC: 30 MG/DL (ref 35–60)
HGB BLD-MCNC: 13.1 G/DL (ref 14–18)
LDLC SERPL CALC-MCNC: 81 MG/DL (ref 50–140)
MCH RBC QN AUTO: 30.9 PG (ref 27–31)
MCHC RBC AUTO-ENTMCNC: 33.3 G/DL (ref 33–36)
MCV RBC AUTO: 92.7 FL (ref 80–94)
PLATELET # BLD AUTO: 270 X10(3)/MCL (ref 130–400)
PMV BLD AUTO: 9.8 FL (ref 7.4–10.4)
POTASSIUM SERPL-SCNC: 4.3 MMOL/L (ref 3.5–5.1)
PROT SERPL-MCNC: 7.3 GM/DL (ref 5.8–7.6)
PSA SERPL-MCNC: <0.1 NG/ML
RBC # BLD AUTO: 4.24 X10(6)/MCL (ref 4.7–6.1)
SODIUM SERPL-SCNC: 143 MMOL/L (ref 136–145)
TRIGL SERPL-MCNC: 189 MG/DL (ref 34–140)
TSH SERPL-ACNC: 2.54 UIU/ML (ref 0.35–4.94)
VLDLC SERPL CALC-MCNC: 38 MG/DL
WBC # BLD AUTO: 5.98 X10(3)/MCL (ref 4.5–11.5)

## 2024-09-12 PROCEDURE — 80076 HEPATIC FUNCTION PANEL: CPT

## 2024-09-12 PROCEDURE — 36415 COLL VENOUS BLD VENIPUNCTURE: CPT

## 2024-09-12 PROCEDURE — 84443 ASSAY THYROID STIM HORMONE: CPT

## 2024-09-12 PROCEDURE — 80061 LIPID PANEL: CPT

## 2024-09-12 PROCEDURE — 84436 ASSAY OF TOTAL THYROXINE: CPT

## 2024-09-12 PROCEDURE — 80048 BASIC METABOLIC PNL TOTAL CA: CPT

## 2024-09-12 PROCEDURE — 84153 ASSAY OF PSA TOTAL: CPT

## 2024-09-12 PROCEDURE — 85027 COMPLETE CBC AUTOMATED: CPT

## 2024-09-12 PROCEDURE — 84479 ASSAY OF THYROID (T3 OR T4): CPT

## 2024-09-13 LAB
FT4I SERPL CALC-MCNC: 6.5 MCG/DL (ref 4.8–12.7)
T4 SERPL IA-MCNC: 7.1 MCG/DL (ref 4.5–11.7)
TOTAL TBC SERPL: 1.1 TBI (ref 0.8–1.3)

## (undated) DEVICE — TROCAR ENDO Z THREAD KII 5X100

## (undated) DEVICE — SYS HYDRO-SURG IRR SMOKE EVAC

## (undated) DEVICE — Device

## (undated) DEVICE — SUT VICRYL+ 27 UR-6 VIOL

## (undated) DEVICE — POUCH INSTRUMENT ADH 10X18IN

## (undated) DEVICE — NDL SYR 10ML 18X1.5 LL BLUNT

## (undated) DEVICE — SCISSOR CURVED ENDOPATH 5MM

## (undated) DEVICE — SUT MCRYL PLUS 4-0 PS2 27IN

## (undated) DEVICE — ADHESIVE DERMABOND ADVANCED

## (undated) DEVICE — PENCIL ELECSURG ROCKER 15FT

## (undated) DEVICE — SOL IRRI STRL WATER 1000ML

## (undated) DEVICE — GLOVE PROTEXIS LTX MICRO 6

## (undated) DEVICE — GLOVE PROTEXIS LTX MICRO 6.5

## (undated) DEVICE — CLIP HEMO-LOK ML

## (undated) DEVICE — NDL HYPO REG 25G X 1 1/2

## (undated) DEVICE — GLOVE PROTEXIS BLUE LATEX 7

## (undated) DEVICE — GLOVE PROTEXIS HYDROGEL SZ7

## (undated) DEVICE — GLOVE PROTEXIS HYDROGEL SZ6.5

## (undated) DEVICE — TROCAR ENDOPATH XCEL 5X100MM

## (undated) DEVICE — CANNULA ENDOPATH XCEL 5X100MM

## (undated) DEVICE — GLOVE PROTEXIS BLUE LATEX 6.5

## (undated) DEVICE — ELECTRODE MEGADYNE L-WIRE 33CM

## (undated) DEVICE — BAG TISSUE RETRIEVAL 5MM

## (undated) DEVICE — SOL NORMAL USPCA 0.9%